# Patient Record
Sex: FEMALE | Race: WHITE | NOT HISPANIC OR LATINO | Employment: FULL TIME | URBAN - METROPOLITAN AREA
[De-identification: names, ages, dates, MRNs, and addresses within clinical notes are randomized per-mention and may not be internally consistent; named-entity substitution may affect disease eponyms.]

---

## 2017-08-15 ENCOUNTER — HOSPITAL ENCOUNTER (INPATIENT)
Facility: HOSPITAL | Age: 47
LOS: 3 days | Discharge: HOME | End: 2017-08-18
Attending: OTOLARYNGOLOGY | Admitting: OTOLARYNGOLOGY

## 2017-08-15 ENCOUNTER — HOSPITAL ENCOUNTER (EMERGENCY)
Facility: HOSPITAL | Age: 47
Discharge: HOME | End: 2017-08-15
Attending: EMERGENCY MEDICINE | Admitting: EMERGENCY MEDICINE

## 2017-08-15 ENCOUNTER — APPOINTMENT (OUTPATIENT)
Dept: CT IMAGING | Facility: HOSPITAL | Age: 47
End: 2017-08-15

## 2017-08-15 VITALS
TEMPERATURE: 98.9 F | OXYGEN SATURATION: 100 % | HEIGHT: 68 IN | BODY MASS INDEX: 26.52 KG/M2 | WEIGHT: 175 LBS | HEART RATE: 103 BPM | SYSTOLIC BLOOD PRESSURE: 105 MMHG | DIASTOLIC BLOOD PRESSURE: 63 MMHG | RESPIRATION RATE: 16 BRPM

## 2017-08-15 DIAGNOSIS — J36 PERITONSILLAR ABSCESS: Primary | ICD-10-CM

## 2017-08-15 LAB
ANION GAP SERPL CALCULATED.3IONS-SCNC: 15.9 MMOL/L
BASOPHILS # BLD AUTO: 0.01 10*3/MM3 (ref 0–0.2)
BASOPHILS # BLD AUTO: 0.03 10*3/MM3 (ref 0–0.2)
BASOPHILS NFR BLD AUTO: 0.1 % (ref 0–1.5)
BASOPHILS NFR BLD AUTO: 0.2 % (ref 0–2)
BUN BLD-MCNC: 8 MG/DL (ref 6–20)
BUN/CREAT SERPL: 10.1 (ref 7–25)
CALCIUM SPEC-SCNC: 9.2 MG/DL (ref 8.6–10.5)
CHLORIDE SERPL-SCNC: 97 MMOL/L (ref 98–107)
CO2 SERPL-SCNC: 22.1 MMOL/L (ref 22–29)
CREAT BLD-MCNC: 0.79 MG/DL (ref 0.57–1)
DEPRECATED RDW RBC AUTO: 45.1 FL (ref 37–54)
DEPRECATED RDW RBC AUTO: 48.3 FL (ref 37–54)
EOSINOPHIL # BLD AUTO: 0 10*3/MM3 (ref 0.1–0.3)
EOSINOPHIL # BLD AUTO: 0 10*3/MM3 (ref 0–0.7)
EOSINOPHIL NFR BLD AUTO: 0 % (ref 0.3–6.2)
EOSINOPHIL NFR BLD AUTO: 0 % (ref 0–4)
ERYTHROCYTE [DISTWIDTH] IN BLOOD BY AUTOMATED COUNT: 12.8 % (ref 11.5–14.5)
ERYTHROCYTE [DISTWIDTH] IN BLOOD BY AUTOMATED COUNT: 13.1 % (ref 11.7–13)
GFR SERPL CREATININE-BSD FRML MDRD: 77 ML/MIN/1.73
GLUCOSE BLD-MCNC: 123 MG/DL (ref 65–99)
HCT VFR BLD AUTO: 39.3 % (ref 35.6–45.5)
HCT VFR BLD AUTO: 40.1 % (ref 37–47)
HGB BLD-MCNC: 12.9 G/DL (ref 11.9–15.5)
HGB BLD-MCNC: 13.4 G/DL (ref 12–16)
IMM GRANULOCYTES # BLD: 0.04 10*3/MM3 (ref 0–0.03)
IMM GRANULOCYTES # BLD: 0.08 10*3/MM3 (ref 0–0.03)
IMM GRANULOCYTES NFR BLD: 0.2 % (ref 0–0.5)
IMM GRANULOCYTES NFR BLD: 0.5 % (ref 0–0.5)
LYMPHOCYTES # BLD AUTO: 0.82 10*3/MM3 (ref 0.6–4.8)
LYMPHOCYTES # BLD AUTO: 1.23 10*3/MM3 (ref 0.9–4.8)
LYMPHOCYTES NFR BLD AUTO: 5 % (ref 20–45)
LYMPHOCYTES NFR BLD AUTO: 6.9 % (ref 19.6–45.3)
MCH RBC QN AUTO: 32.1 PG (ref 27–31)
MCH RBC QN AUTO: 33.1 PG (ref 26.9–32)
MCHC RBC AUTO-ENTMCNC: 32.8 G/DL (ref 32.4–36.3)
MCHC RBC AUTO-ENTMCNC: 33.4 G/DL (ref 31–37)
MCV RBC AUTO: 100.8 FL (ref 80.5–98.2)
MCV RBC AUTO: 95.9 FL (ref 81–99)
MONOCYTES # BLD AUTO: 1.06 10*3/MM3 (ref 0–1)
MONOCYTES # BLD AUTO: 1.64 10*3/MM3 (ref 0.2–1.2)
MONOCYTES NFR BLD AUTO: 6.4 % (ref 3–8)
MONOCYTES NFR BLD AUTO: 9.2 % (ref 5–12)
NEUTROPHILS # BLD AUTO: 14.5 10*3/MM3 (ref 1.5–8.3)
NEUTROPHILS # BLD AUTO: 14.88 10*3/MM3 (ref 1.9–8.1)
NEUTROPHILS NFR BLD AUTO: 83.6 % (ref 42.7–76)
NEUTROPHILS NFR BLD AUTO: 87.9 % (ref 45–70)
NRBC BLD MANUAL-RTO: 0 /100 WBC (ref 0–0)
PLATELET # BLD AUTO: 229 10*3/MM3 (ref 140–500)
PLATELET # BLD AUTO: 276 10*3/MM3 (ref 140–500)
PMV BLD AUTO: 8.9 FL (ref 7.4–10.4)
PMV BLD AUTO: 9.4 FL (ref 6–12)
POTASSIUM BLD-SCNC: 3.7 MMOL/L (ref 3.5–5.2)
RBC # BLD AUTO: 3.9 10*6/MM3 (ref 3.9–5.2)
RBC # BLD AUTO: 4.18 10*6/MM3 (ref 4.2–5.4)
SODIUM BLD-SCNC: 135 MMOL/L (ref 136–145)
WBC NRBC COR # BLD: 16.49 10*3/MM3 (ref 4.8–10.8)
WBC NRBC COR # BLD: 17.8 10*3/MM3 (ref 4.5–10.7)

## 2017-08-15 PROCEDURE — 85025 COMPLETE CBC W/AUTO DIFF WBC: CPT | Performed by: OTOLARYNGOLOGY

## 2017-08-15 PROCEDURE — 99282 EMERGENCY DEPT VISIT SF MDM: CPT | Performed by: EMERGENCY MEDICINE

## 2017-08-15 PROCEDURE — 70491 CT SOFT TISSUE NECK W/DYE: CPT

## 2017-08-15 PROCEDURE — 25810000003 SODIUM CHLORIDE 0.9 % WITH KCL 20 MEQ 20-0.9 MEQ/L-% SOLUTION: Performed by: OTOLARYNGOLOGY

## 2017-08-15 PROCEDURE — 96365 THER/PROPH/DIAG IV INF INIT: CPT

## 2017-08-15 PROCEDURE — 96375 TX/PRO/DX INJ NEW DRUG ADDON: CPT

## 2017-08-15 PROCEDURE — 85025 COMPLETE CBC W/AUTO DIFF WBC: CPT | Performed by: EMERGENCY MEDICINE

## 2017-08-15 PROCEDURE — 25010000002 FENTANYL CITRATE (PF) 100 MCG/2ML SOLUTION: Performed by: EMERGENCY MEDICINE

## 2017-08-15 PROCEDURE — 99283 EMERGENCY DEPT VISIT LOW MDM: CPT

## 2017-08-15 PROCEDURE — 25010000002 MORPHINE PER 10 MG: Performed by: OTOLARYNGOLOGY

## 2017-08-15 PROCEDURE — G0378 HOSPITAL OBSERVATION PER HR: HCPCS

## 2017-08-15 PROCEDURE — 0 IOPAMIDOL 61 % SOLUTION: Performed by: OTOLARYNGOLOGY

## 2017-08-15 PROCEDURE — 80048 BASIC METABOLIC PNL TOTAL CA: CPT | Performed by: EMERGENCY MEDICINE

## 2017-08-15 RX ORDER — MORPHINE SULFATE 2 MG/ML
2 INJECTION, SOLUTION INTRAMUSCULAR; INTRAVENOUS EVERY 4 HOURS PRN
Status: DISCONTINUED | OUTPATIENT
Start: 2017-08-15 | End: 2017-08-15

## 2017-08-15 RX ORDER — OXYCODONE HYDROCHLORIDE AND ACETAMINOPHEN 5; 325 MG/1; MG/1
2 TABLET ORAL EVERY 4 HOURS PRN
Status: DISCONTINUED | OUTPATIENT
Start: 2017-08-15 | End: 2017-08-18 | Stop reason: HOSPADM

## 2017-08-15 RX ORDER — CLINDAMYCIN HYDROCHLORIDE 300 MG/1
300 CAPSULE ORAL 4 TIMES DAILY
Qty: 40 CAPSULE | Refills: 0 | Status: SHIPPED | OUTPATIENT
Start: 2017-08-15 | End: 2017-08-18 | Stop reason: HOSPADM

## 2017-08-15 RX ORDER — CLINDAMYCIN PHOSPHATE 900 MG/50ML
900 INJECTION INTRAVENOUS EVERY 6 HOURS
Status: DISCONTINUED | OUTPATIENT
Start: 2017-08-15 | End: 2017-08-18 | Stop reason: HOSPADM

## 2017-08-15 RX ORDER — SODIUM CHLORIDE AND POTASSIUM CHLORIDE 150; 900 MG/100ML; MG/100ML
110 INJECTION, SOLUTION INTRAVENOUS CONTINUOUS
Status: DISCONTINUED | OUTPATIENT
Start: 2017-08-15 | End: 2017-08-18 | Stop reason: HOSPADM

## 2017-08-15 RX ORDER — ONDANSETRON HYDROCHLORIDE 8 MG/1
8 TABLET, FILM COATED ORAL EVERY 8 HOURS PRN
Status: DISCONTINUED | OUTPATIENT
Start: 2017-08-15 | End: 2017-08-18 | Stop reason: HOSPADM

## 2017-08-15 RX ORDER — OXYCODONE HYDROCHLORIDE AND ACETAMINOPHEN 5; 325 MG/1; MG/1
TABLET ORAL
Qty: 24 TABLET | Refills: 0 | Status: SHIPPED | OUTPATIENT
Start: 2017-08-15 | End: 2017-08-18 | Stop reason: HOSPADM

## 2017-08-15 RX ORDER — MORPHINE SULFATE 2 MG/ML
2 INJECTION, SOLUTION INTRAMUSCULAR; INTRAVENOUS EVERY 4 HOURS PRN
Status: DISCONTINUED | OUTPATIENT
Start: 2017-08-15 | End: 2017-08-18 | Stop reason: HOSPADM

## 2017-08-15 RX ORDER — SODIUM CHLORIDE 0.9 % (FLUSH) 0.9 %
10 SYRINGE (ML) INJECTION AS NEEDED
Status: DISCONTINUED | OUTPATIENT
Start: 2017-08-15 | End: 2017-08-15 | Stop reason: HOSPADM

## 2017-08-15 RX ORDER — CLINDAMYCIN PHOSPHATE 900 MG/50ML
900 INJECTION INTRAVENOUS ONCE
Status: COMPLETED | OUTPATIENT
Start: 2017-08-15 | End: 2017-08-15

## 2017-08-15 RX ORDER — FENTANYL CITRATE 50 UG/ML
75 INJECTION, SOLUTION INTRAMUSCULAR; INTRAVENOUS ONCE
Status: COMPLETED | OUTPATIENT
Start: 2017-08-15 | End: 2017-08-15

## 2017-08-15 RX ADMIN — POTASSIUM CHLORIDE AND SODIUM CHLORIDE 110 ML/HR: 900; 150 INJECTION, SOLUTION INTRAVENOUS at 16:40

## 2017-08-15 RX ADMIN — FENTANYL CITRATE 75 MCG: 50 INJECTION, SOLUTION INTRAMUSCULAR; INTRAVENOUS at 10:01

## 2017-08-15 RX ADMIN — MORPHINE SULFATE 2 MG: 2 INJECTION, SOLUTION INTRAMUSCULAR; INTRAVENOUS at 20:27

## 2017-08-15 RX ADMIN — MORPHINE SULFATE 2 MG: 2 INJECTION, SOLUTION INTRAMUSCULAR; INTRAVENOUS at 15:45

## 2017-08-15 RX ADMIN — CLINDAMYCIN PHOSPHATE 900 MG: 18 INJECTION, SOLUTION INTRAMUSCULAR; INTRAVENOUS at 16:40

## 2017-08-15 RX ADMIN — CLINDAMYCIN PHOSPHATE 900 MG: 18 INJECTION, SOLUTION INTRAVENOUS at 10:03

## 2017-08-15 RX ADMIN — IOPAMIDOL 75 ML: 612 INJECTION, SOLUTION INTRAVENOUS at 17:00

## 2017-08-15 RX ADMIN — CLINDAMYCIN PHOSPHATE 900 MG: 18 INJECTION, SOLUTION INTRAMUSCULAR; INTRAVENOUS at 22:25

## 2017-08-15 NOTE — ED PROVIDER NOTES
"Subjective   History of Present Illness  History of Present Illness    · Chief complaint: Left ear and throat pain    · Location: Left ear and throat    · Quality/Severity: Severe pain \"like an ice pick being pokeed in my ear\"    · Timing/Onset: The pain started last night.    · Modifying Factors: Nothing relieves.    · Associated symptoms: She denies fever or pain in the right ear.    · Narrative: The patient is a 47-year-old white female complaining of severe pain in her left ear and a burning sensation in her throat since last night.  She denies any hearing loss.    ED Triage Vitals   Temp Heart Rate Resp BP SpO2   08/15/17 0855 08/15/17 0855 08/15/17 0855 08/15/17 0855 08/15/17 0855   98.9 °F (37.2 °C) 74 20 125/57 99 %      Temp src Heart Rate Source Patient Position BP Location FiO2 (%)   08/15/17 0855 08/15/17 0855 08/15/17 0855 08/15/17 0855 --   Oral Monitor Lying Left arm        Review of Systems   Constitutional: Negative for activity change, appetite change, chills, diaphoresis, fatigue and fever.   HENT: Positive for ear pain and sore throat. Negative for congestion, dental problem, hearing loss, mouth sores, postnasal drip, rhinorrhea, sinus pressure and voice change.    Eyes: Negative for photophobia, pain, discharge, redness and visual disturbance.   Respiratory: Negative for cough, chest tightness, shortness of breath, wheezing and stridor.    Cardiovascular: Negative for chest pain, palpitations and leg swelling.   Gastrointestinal: Negative for abdominal pain, diarrhea, nausea and vomiting.   Genitourinary: Negative for difficulty urinating, dysuria, flank pain, frequency, hematuria and urgency.   Musculoskeletal: Negative for arthralgias, back pain, gait problem, joint swelling, myalgias, neck pain and neck stiffness.   Skin: Negative for color change and rash.   Neurological: Negative for dizziness, tremors, seizures, syncope, facial asymmetry, speech difficulty, weakness, light-headedness, " numbness and headaches.   Hematological: Negative for adenopathy.   Psychiatric/Behavioral: Negative.  Negative for confusion and decreased concentration. The patient is not nervous/anxious.        Past Medical History:   Diagnosis Date   • Abnormal uterine bleeding    • Back pain    • Uterine fibroid     uterine and ovarian nodules       Allergies   Allergen Reactions   • Penicillins Anaphylaxis       Past Surgical History:   Procedure Laterality Date   • D&C HYSTEROSCOPY N/A 5/17/2016    Procedure: DILATATION AND CURETTAGE HYSTEROSCOPY;  Surgeon: Suzy Chino MD;  Location: [facility];  Service:    • DILATATION AND CURETTAGE     • TUBAL ABDOMINAL LIGATION         Family History   Problem Relation Age of Onset   • Breast cancer Maternal Grandmother    • Breast cancer Maternal Aunt        Social History     Social History   • Marital status:      Spouse name: N/A   • Number of children: N/A   • Years of education: N/A     Social History Main Topics   • Smoking status: Current Every Day Smoker     Packs/day: 0.25     Years: 30.00   • Smokeless tobacco: Never Used      Comment: Less than a pack a week   • Alcohol use Yes      Comment: occasionally   • Drug use: No   • Sexual activity: Defer     Other Topics Concern   • None     Social History Narrative   • None           Objective   Physical Exam   Constitutional: She is oriented to person, place, and time. She appears well-developed and well-nourished. She appears distressed.   The patient is in severe distress secondary to pain.   HENT:   Head: Normocephalic and atraumatic.   Right Ear: External ear normal.   Left Ear: External ear normal.   Nose: Nose normal.   Mouth/Throat: No oropharyngeal exudate.   Both external ear canals are clean without exudate or swelling.  Both tympanic membranes are normal in appearance.  On oral examination the patient has a large left peritonsillar swelling consistent with a peritonsillar abscess.  The pharyngeal  erythema and there is no exudates.   Eyes: EOM are normal. Pupils are equal, round, and reactive to light. Right eye exhibits no discharge. Left eye exhibits no discharge. No scleral icterus.   Neck: Normal range of motion. Neck supple. No JVD present. No thyromegaly present.   Cardiovascular: Normal rate, regular rhythm and normal heart sounds.    No murmur heard.  Pulmonary/Chest: Effort normal and breath sounds normal. She has no wheezes. She has no rales. She exhibits no tenderness.   Abdominal: Soft. Bowel sounds are normal. She exhibits no distension. There is no tenderness.   Musculoskeletal: Normal range of motion. She exhibits no edema, tenderness or deformity.   Lymphadenopathy:     She has no cervical adenopathy.   Neurological: She is alert and oriented to person, place, and time. No cranial nerve deficit. Coordination normal.   No focal motor sensory deficit   Skin: Skin is warm and dry. No rash noted. She is not diaphoretic.   Psychiatric: She has a normal mood and affect. Her behavior is normal. Judgment and thought content normal.   Nursing note and vitals reviewed.      Procedures         ED Course  ED Course   Comment By Time   Fentanyl 75 µg IV.  Clindamycin 900 mg IV. Donato Munoz MD 08/15 1040   09:40 ACC discussed with Dr. Garnde, ENT, requests the patient be sent directly after IV antibiotics to their [place] office to see him so he can I&D the recovery tonsillar abscess. Donato Munoz MD 08/15 1040   The patient stated her pain was significantly improved at discharge.  I instructed her to go directly to Dr. Grande's office. Donato Munoz MD 08/15 0357                  MDM  Number of Diagnoses or Management Options  Peritonsillar abscess: new and does not require workup     Amount and/or Complexity of Data Reviewed  Discuss the patient with other providers: yes    Risk of Complications, Morbidity, and/or Mortality  Presenting problems: high  Diagnostic procedures: low  Management options:  high    Patient Progress  Patient progress: stable      Final diagnoses:   Peritonsillar abscess           Labs Reviewed   BASIC METABOLIC PANEL - Abnormal; Notable for the following:        Result Value    Glucose 123 (*)     Sodium 135 (*)     Chloride 97 (*)     All other components within normal limits    Narrative:     GFR Normal >60  Chronic Kidney Disease <60  Kidney Failure <15   CBC WITH AUTO DIFFERENTIAL - Abnormal; Notable for the following:     WBC 16.49 (*)     RBC 4.18 (*)     MCH 32.1 (*)     Neutrophil % 87.9 (*)     Lymphocyte % 5.0 (*)     Neutrophils, Absolute 14.50 (*)     Monocytes, Absolute 1.06 (*)     Eosinophils, Absolute 0.00 (*)     Immature Grans, Absolute 0.08 (*)     All other components within normal limits   CBC AND DIFFERENTIAL    Narrative:     The following orders were created for panel order CBC & Differential.  Procedure                               Abnormality         Status                     ---------                               -----------         ------                     CBC Auto Differential[[id number]]         Abnormal            Final result                 Please view results for these tests on the individual orders.     No orders to display          Medication List      New Prescriptions          clindamycin 300 MG capsule   Commonly known as:  CLEOCIN   Take 1 capsule by mouth 4 (Four) Times a Day for 10 days.       oxyCODONE-acetaminophen 5-325 MG per tablet   Commonly known as:  PERCOCET   1 - 2 tablets po q 4 hours PRN sever pain                Donato Munoz MD  08/15/17 6634

## 2017-08-15 NOTE — PLAN OF CARE
Problem: Patient Care Overview (Adult)  Goal: Plan of Care Review  Outcome: Ongoing (interventions implemented as appropriate)    08/15/17 1035   Coping/Psychosocial Response Interventions   Plan Of Care Reviewed With patient   Patient Care Overview   Progress improving   Outcome Evaluation   Outcome Summary/Follow up Plan Direct admit of Dr. Grande. Patient seen in [facility] ER, earlier this morning. Persistent pain in left facial area extending into neck, pain controlled with IV morphine. Admitted for peritonsillar abscess per Dr. Grande. Unable to eat or drink at this time. IV fluids and antibiotics started. CT of neck complete. VSS. Telemetry monitor, NSR. Will continue to monitor.          Problem: Pain, Acute (Adult)  Goal: Identify Related Risk Factors and Signs and Symptoms  Outcome: Outcome(s) achieved Date Met:  08/15/17  Goal: Acceptable Pain Control/Comfort Level  Outcome: Ongoing (interventions implemented as appropriate)    Problem: Fall Risk (Adult)  Goal: Identify Related Risk Factors and Signs and Symptoms  Outcome: Ongoing (interventions implemented as appropriate)  Goal: Absence of Falls  Outcome: Ongoing (interventions implemented as appropriate)

## 2017-08-15 NOTE — ED NOTES
Call placed to ENT office at [phone], transferred to Dr. Munoz.   Call Complete      Stephany Urena CNA  08/15/17 4773

## 2017-08-16 PROBLEM — J36 PERITONSILLAR ABSCESS: Status: ACTIVE | Noted: 2017-08-16

## 2017-08-16 LAB
BASOPHILS # BLD AUTO: 0.03 10*3/MM3 (ref 0–0.2)
BASOPHILS NFR BLD AUTO: 0.2 % (ref 0–1.5)
DEPRECATED RDW RBC AUTO: 49.2 FL (ref 37–54)
EOSINOPHIL # BLD AUTO: 0 10*3/MM3 (ref 0–0.7)
EOSINOPHIL NFR BLD AUTO: 0 % (ref 0.3–6.2)
ERYTHROCYTE [DISTWIDTH] IN BLOOD BY AUTOMATED COUNT: 13.2 % (ref 11.7–13)
HCT VFR BLD AUTO: 37.6 % (ref 35.6–45.5)
HGB BLD-MCNC: 11.9 G/DL (ref 11.9–15.5)
IMM GRANULOCYTES # BLD: 0.04 10*3/MM3 (ref 0–0.03)
IMM GRANULOCYTES NFR BLD: 0.3 % (ref 0–0.5)
LYMPHOCYTES # BLD AUTO: 1.63 10*3/MM3 (ref 0.9–4.8)
LYMPHOCYTES NFR BLD AUTO: 10.9 % (ref 19.6–45.3)
MCH RBC QN AUTO: 31.9 PG (ref 26.9–32)
MCHC RBC AUTO-ENTMCNC: 31.6 G/DL (ref 32.4–36.3)
MCV RBC AUTO: 100.8 FL (ref 80.5–98.2)
MONOCYTES # BLD AUTO: 1.59 10*3/MM3 (ref 0.2–1.2)
MONOCYTES NFR BLD AUTO: 10.7 % (ref 5–12)
NEUTROPHILS # BLD AUTO: 11.61 10*3/MM3 (ref 1.9–8.1)
NEUTROPHILS NFR BLD AUTO: 77.9 % (ref 42.7–76)
NRBC BLD MANUAL-RTO: 0 /100 WBC (ref 0–0)
PLATELET # BLD AUTO: 246 10*3/MM3 (ref 140–500)
PMV BLD AUTO: 9.4 FL (ref 6–12)
RBC # BLD AUTO: 3.73 10*6/MM3 (ref 3.9–5.2)
WBC NRBC COR # BLD: 14.9 10*3/MM3 (ref 4.5–10.7)

## 2017-08-16 PROCEDURE — 25010000002 MORPHINE PER 10 MG: Performed by: OTOLARYNGOLOGY

## 2017-08-16 PROCEDURE — 85025 COMPLETE CBC W/AUTO DIFF WBC: CPT | Performed by: OTOLARYNGOLOGY

## 2017-08-16 PROCEDURE — 25810000003 SODIUM CHLORIDE 0.9 % WITH KCL 20 MEQ 20-0.9 MEQ/L-% SOLUTION: Performed by: OTOLARYNGOLOGY

## 2017-08-16 RX ADMIN — MORPHINE SULFATE 4 MG: 4 INJECTION, SOLUTION INTRAMUSCULAR; INTRAVENOUS at 20:51

## 2017-08-16 RX ADMIN — MORPHINE SULFATE 4 MG: 4 INJECTION, SOLUTION INTRAMUSCULAR; INTRAVENOUS at 13:00

## 2017-08-16 RX ADMIN — CLINDAMYCIN PHOSPHATE 900 MG: 18 INJECTION, SOLUTION INTRAMUSCULAR; INTRAVENOUS at 20:52

## 2017-08-16 RX ADMIN — POTASSIUM CHLORIDE AND SODIUM CHLORIDE 110 ML/HR: 900; 150 INJECTION, SOLUTION INTRAVENOUS at 20:52

## 2017-08-16 RX ADMIN — CLINDAMYCIN PHOSPHATE 900 MG: 18 INJECTION, SOLUTION INTRAMUSCULAR; INTRAVENOUS at 16:27

## 2017-08-16 RX ADMIN — POTASSIUM CHLORIDE AND SODIUM CHLORIDE 110 ML/HR: 900; 150 INJECTION, SOLUTION INTRAVENOUS at 01:49

## 2017-08-16 RX ADMIN — MORPHINE SULFATE 2 MG: 2 INJECTION, SOLUTION INTRAMUSCULAR; INTRAVENOUS at 04:09

## 2017-08-16 RX ADMIN — CLINDAMYCIN PHOSPHATE 900 MG: 18 INJECTION, SOLUTION INTRAMUSCULAR; INTRAVENOUS at 04:09

## 2017-08-16 RX ADMIN — MORPHINE SULFATE 4 MG: 4 INJECTION, SOLUTION INTRAMUSCULAR; INTRAVENOUS at 08:49

## 2017-08-16 RX ADMIN — CLINDAMYCIN PHOSPHATE 900 MG: 18 INJECTION, SOLUTION INTRAMUSCULAR; INTRAVENOUS at 09:56

## 2017-08-16 NOTE — SIGNIFICANT NOTE
08/16/17 0857   Rehab Treatment   Discipline speech language pathologist   Rehab Evaluation   Evaluation Not Performed other (see comments)  (Pt unable to participate in swallow evaluation.  Pt with left sided soreness of neck to touch.  States she is unable to swallow liquids or saliva.  Spitting out saliva on entrance to room.  Will f/u for ability to paricipate .)

## 2017-08-16 NOTE — PAYOR COMM NOTE
"Cara Eisenberg (47 y.o. Female)     PLEASE SEE ATTACHED CLINICAL REVIEW ON PATIENT. PT. WAS ADMITTED TO [facility] ON 8/15/17.    REF#[id number]    PLEASE CALL   OR  [phone] WITH INPT AUTH AND DAYS APPROVED. [facility] IS DRG WITH [facility].     THANK YOU    NERIS JEFFRIES, KRISTYN, CCP    Date of Birth Social Security Number Address Home Phone MRN    1970  [id number] [phone] [id number]    [employer] Status          None        Admission Date Admission Type Admitting Provider Attending Provider Department, Room/Bed    8/15/17 Elective Jesus Grande MD Gould, Jesus Calderon MD [address], 632/1    Discharge Date Discharge Disposition Discharge Destination                      Attending Provider: Jesus Grande MD     Allergies:  Penicillins    Isolation:  None   Infection:  None   Code Status:  FULL    Ht:  68\" (172.7 cm)   Wt:  175 lb (79.4 kg)    Admission Cmt:  None   Principal Problem:  None                Active Insurance as of 8/15/2017     Primary Coverage     Payor Plan Insurance Group Employer/Plan Group    [employer]UE [employer] PPO [id number]     Payor Plan Address Payor Plan Phone Number Effective From Effective To    [phone] 10/1/2015     [place] [address]       Subscriber Name Subscriber Birth Date Member ID       CARA EISENBERG 1970 [id number]                 Emergency Contacts      (Rel.) Home Phone Work Phone Mobile Phone    Michi Eisenberg (Spouse) [phone] --              Intake & Output (last day)       08/15 0701 - 08/16 0700 08/16 0701 - 08/17 0700    Urine (mL/kg/hr) 650     Total Output 650      Net -650            Unmeasured Urine Occurrence 1 x         Lab Results (all)     Procedure Component Value Units Date/Time    CBC & Differential [[id number]] Collected:  08/15/17 1703    Specimen:  Blood Updated:  " 08/15/17 1719    Narrative:       CBC Auto Differential [[id number]]  (Abnormal) Collected:  08/15/17 1703    Specimen:  Blood Updated:  08/15/17 1719     WBC 17.80 (H) 10*3/mm3      RBC 3.90 10*6/mm3      Hemoglobin 12.9 g/dL      Hematocrit 39.3 %      .8 (H) fL      MCH 33.1 (H) pg      MCHC 32.8 g/dL      RDW 13.1 (H) %      RDW-SD 48.3 fl      MPV 9.4 fL      Platelets 229 10*3/mm3      Neutrophil % 83.6 (H) %      Lymphocyte % 6.9 (L) %      Monocyte % 9.2 %      Eosinophil % 0.0 (L) %      Basophil % 0.1 %      Immature Grans % 0.2 %      Neutrophils, Absolute 14.88 (H) 10*3/mm3      Lymphocytes, Absolute 1.23 10*3/mm3      Monocytes, Absolute 1.64 (H) 10*3/mm3      Eosinophils, Absolute 0.00 10*3/mm3      Basophils, Absolute 0.01 10*3/mm3      Immature Grans, Absolute 0.04 (H) 10*3/mm3     CBC & Differential [[id number]] Collected:  08/16/17 0512    Specimen:  Blood Updated:  08/16/17 0607    Narrative:       CBC Auto Differential [[id number]]  (Abnormal) Collected:  08/16/17 0512    Specimen:  Blood Updated:  08/16/17 0607     WBC 14.90 (H) 10*3/mm3      RBC 3.73 (L) 10*6/mm3      Hemoglobin 11.9 g/dL      Hematocrit 37.6 %      .8 (H) fL      MCH 31.9 pg      MCHC 31.6 (L) g/dL      RDW 13.2 (H) %      RDW-SD 49.2 fl      MPV 9.4 fL      Platelets 246 10*3/mm3      Neutrophil % 77.9 (H) %      Lymphocyte % 10.9 (L) %      Monocyte % 10.7 %      Eosinophil % 0.0 (L) %      Basophil % 0.2 %      Immature Grans % 0.3 %      Neutrophils, Absolute 11.61 (H) 10*3/mm3      Lymphocytes, Absolute 1.63 10*3/mm3      Monocytes, Absolute 1.59 (H) 10*3/mm3      Eosinophils, Absolute 0.00 10*3/mm3      Basophils, Absolute 0.03 10*3/mm3      Immature Grans, Absolute 0.04 (H) 10*3/mm3      nRBC 0.0 /100 WBC           Imaging Results (all)     Procedure Component Value Units Date/Time    CT Soft Tissue Neck With Contrast [[id number]] Collected:  08/15/17 1715     Updated:  08/15/17 1737    Narrative:       CT  SOFT TISSUE NECK W CONTRAST-     INDICATIONS: Possible peritonsillar abscess     TECHNIQUE: ENHANCED NECK CT     COMPARISON: None available     FINDINGS:     Pharyngeal tonsillar hypertrophy is present bilaterally, more prominent  on the left than on the right. Several foci of low density are seen in  the pharyngeal tonsils, for example 9 mm on image 27 of series 1, or 1.3  cm on image 28 of series 1, suspicious for developing multiloculated  abscess.     Asymmetric soft tissue swelling extends inferiorly along the left  hypopharynx to the level of the base of the epiglottis, for example  image 42 of series 1. Uvular and soft palate hypertrophy are apparent.     At the indicated area of palpable concern at the left anterior lateral  upper neck, asymmetric jugulodigastric lymph node hypertrophy is seen,  for example 3.8 cm on image 48 of series 2, likely reactive in nature,  possibly neoplasm not entirely excluded, follow-up recommended. Right  jugular digastric lymph node is also prominent, 3.2 cm on image 46 of  series 2.     No other enhancing collections or masses are identified in the neck.     Along the inferior posterior aspect of the left parotid gland, image 30  of series 1, 4 mm nodular density may represent lymph node parotid  nodule. Interval follow-up could characterize change. Parotid,  submandibular, thyroid glands otherwise appear unremarkable.     Chronic appearing asymmetry of the right anterior maxillary sinus wall  could be result of past injury. The right maxillary sinus is mostly  opacified.     Visualized lung apices appear clear.     Visualized base of the brain shows likely arachnoid cyst posterior  fossa, 2.3 cm on image 10 of series 1.       Impression:             1. Left more than right pharyngeal tonsillar hypertrophy, with  appearance suspicious for developing multiloculated abscess in the left  pharyngeal tonsils, as well as conspicuous asymmetric left  hypopharyngitis. Close follow-up  imaging can characterize progression of  the findings. Jugulodigastric lymph node hypertrophy, left more than  right.     2. Right maxillary sinus disease.     Discussed by telephone with patient's nurse, Sallie, at time of  interpretation, 1734 on 08/15/2017     This report was finalized on 8/15/2017 5:34 PM by Dr. Asad Castle MD.             Orders (all)     Start     Ordered    08/25/17 1550  morphine injection 4 mg  Every 4 Hours PRN,   Status:  Discontinued      08/15/17 1550    08/16/17 0749  Inpatient Admission  Once      08/16/17 0751    08/16/17 0749  Full Code  Continuous      08/16/17 0751    08/16/17 0600  CBC & Differential  Daily      08/15/17 1537    08/16/17 0600  CBC Auto Differential  PROCEDURE ONCE      08/16/17 0001    08/16/17 0553  Scan Slide  Once,   Status:  Canceled      08/16/17 0552    08/15/17 1700  iopamidol (ISOVUE-300) 61 % injection 100 mL  Once in Imaging      08/15/17 1627    08/15/17 1615  clindamycin (CLEOCIN) 900 mg in dextrose 5% 50 mL IVPB (premix)  Every 6 Hours      08/15/17 1537      08/15/17 1615  sodium chloride 0.9 % with KCl 20 mEq/L infusion  Continuous      08/15/17 1537    08/15/17 1600  Vital Signs  Every 8 Hours      08/15/17 1537    08/15/17 1551  morphine injection 2 mg  Every 4 Hours PRN      08/15/17 1552    08/15/17 1551  morphine injection 4 mg  Every 4 Hours PRN      08/15/17 1552    08/15/17 1550  morphine injection 2 mg  Every 4 Hours PRN,   Status:  Discontinued      08/15/17 1550    08/15/17 1536  SLP Consult: Eval & Treat  Once      08/15/17 1537    08/15/17 1533  Oral Suction  Once      08/15/17 1537    08/15/17 1533  CBC & Differential  Once      08/15/17 1537    08/15/17 1533  CBC Auto Differential  PROCEDURE ONCE      08/15/17 1537    08/15/17 1532  CT Soft Tissue Neck With Contrast  1 Time Imaging      08/15/17 1537    08/15/17 1532  Head of Bed  Until Discontinued     Comments:  Head of bed 30 degrees    08/15/17 1537    08/15/17 1531  Diet  Clear Liquid  Diet Effective Now      08/15/17 1537    08/15/17 1530  ondansetron (ZOFRAN) tablet 8 mg  Every 8 Hours PRN      08/15/17 1537    08/15/17 1529  morphine injection 2 mg  Every 4 Hours PRN,   Status:  Discontinued      08/15/17 1537    08/15/17 1528  oxyCODONE-acetaminophen (PERCOCET) 5-325 MG per tablet 2 tablet  Every 4 Hours PRN      08/15/17 1537    08/15/17 1528  Activity As Tolerated  Until Discontinued      08/15/17 1537          April Hernández MS CCC-SLP Speech and Language Pathologist Signed Speech Therapy Significant Event Date of Service: 8/16/2017  8:58 AM                08/16/17 0857   Rehab Treatment   Discipline speech language pathologist   Rehab Evaluation   Evaluation Not Performed other (see comments)  (Pt unable to participate in swallow evaluation.  Pt with left sided soreness of neck to touch.  States she is unable to swallow liquids or saliva.  Spitting out saliva on entrance to room.  Will f/u for ability to paricipate .)                          Elsa Montague, RN Registered Nurse Signed  Plan of Care Date of Service: 8/16/2017  3:24 AM         Problem: Patient Care Overview (Adult)  Goal: Plan of Care Review  Outcome: Ongoing (interventions implemented as appropriate)     08/16/17 0317   Coping/Psychosocial Response Interventions   Plan Of Care Reviewed With patient   Patient Care Overview   Progress improving   Outcome Evaluation   Outcome Summary/Follow up Plan Swelling of left side of face still visible, on IV antibx, IVF maintained, pain on and off, better early AM, offered pain meds but refused at one point, up with assist to bathroom, talked with ENT on-call yesterday and they said they are going to see patient in Am, NSR in monitor, VSS, no fever, will monitor       Goal: Adult Individualization and Mutuality  Outcome: Ongoing (interventions implemented as appropriate)     08/16/17 0317   Individualization   Patient Specific Goals pain control and treat infection and  swelling   Patient Specific Interventions given pain meds as needed, IVantibx maintained          Problem: Pain, Acute (Adult)  Goal: Acceptable Pain Control/Comfort Level  Outcome: Ongoing (interventions implemented as appropriate)     08/16/17 0317   Pain, Acute (Adult)   Acceptable Pain Control/Comfort Level making progress toward outcome          Problem: Fall Risk (Adult)  Goal: Identify Related Risk Factors and Signs and Symptoms  Outcome: Ongoing (interventions implemented as appropriate)     08/16/17 0317   Fall Risk   Fall Risk: Related Risk Factors fatigue/slow reaction   Fall Risk: Signs and Symptoms presence of risk factors       Goal: Absence of Falls  Outcome: Ongoing (interventions implemented as appropriate)     08/16/17 0317   Fall Risk (Adult)   Absence of Falls making progress toward outcome          Problem: Infection, Risk/Actual (Adult)  Goal: Identify Related Risk Factors and Signs and Symptoms  Outcome: Ongoing (interventions implemented as appropriate)  Goal: Infection Prevention/Resolution  Outcome: Ongoing (interventions implemented as appropriate)     08/16/17 0317   Infection, Risk/Actual (Adult)   Infection Prevention/Resolution making progress toward outcome                                 Sallie Manzo RN Registered Nurse Signed  Plan of Care Date of Service: 8/15/2017  6:26 PM         Problem: Patient Care Overview (Adult)  Goal: Plan of Care Review  Outcome: Ongoing (interventions implemented as appropriate)     08/15/17 1823   Coping/Psychosocial Response Interventions   Plan Of Care Reviewed With patient   Patient Care Overview   Progress improving   Outcome Evaluation   Outcome Summary/Follow up Plan Direct admit of Dr. Grande. Patient seen in [facility] ER, earlier this morning. Persistent pain in left facial area extending into neck, pain controlled with IV morphine. Admitted for peritonsillar abscess per Dr. Grande. Unable to eat or drink at this time. IV fluids and  antibiotics started. CT of neck complete. VSS. Telemetry monitor, NSR. Will continue to monitor.           Problem: Pain, Acute (Adult)  Goal: Identify Related Risk Factors and Signs and Symptoms  Outcome: Outcome(s) achieved Date Met:  08/15/17  Goal: Acceptable Pain Control/Comfort Level  Outcome: Ongoing (interventions implemented as appropriate)     Problem: Fall Risk (Adult)  Goal: Identify Related Risk Factors and Signs and Symptoms  Outcome: Ongoing (interventions implemented as appropriate)  Goal: Absence of Falls  Outcome: Ongoing (interventions implemented as appropriate)                                      All medication doses during the admission are shown, including meds that are no longer on order.     Scheduled Meds Sorted by Name for Cara Eisenberg as of 8/14/17 through 8/16/17       1 Day 3 Days 7 Days 10 Days < Today >    Legend:                          Inactive     Active     Other Encounter    Linked               Medications 08/14/17 08/15/17 08/16/17   ceFAZolin (ANCEF) IVPB (duplex) 1 g  Dose: 1 g Freq: Once Route: IV  Indications of Use: SKIN AND SOFT TISSUE INFECTION  Start: 08/15/17 0937 End: 08/15/17 0942     0937     0942-D/C'd         clindamycin (CLEOCIN) 900 mg in dextrose 5% 50 mL IVPB (premix)  Dose: 900 mg Freq: Every 6 Hours Route: IV  Indications Comment: Peritonsillar Abscess  Last Dose: Stopped (08/16/17 0510)  Start: 08/15/17 1615    Admin Instructions:   Do Not refrigerate.     1640     2000     2225       2330            0409     0510     1015       1615     2215            clindamycin (CLEOCIN) 900 mg in dextrose 5% 50 mL IVPB (premix)  Dose: 900 mg Freq: Once Route: IV  Indications of Use: SKIN AND SOFT TISSUE INFECTION  Last Dose: Stopped (08/15/17 1031)  Start: 08/15/17 0944 End: 08/15/17 1031    Admin Instructions:   Do Not refrigerate.     1003     1031             fentaNYL citrate (PF) (SUBLIMAZE) injection 75 mcg  Dose: 75 mcg Freq: Once Route: IV  Start: 08/15/17  0937 End: 08/15/17 1001    Admin Instructions:   Use filter needle to withdraw dose from ampule.  If given for pain, use the following pain scale:  Mild Pain = Pain Score of 1-3, CPOT 1-2  Moderate Pain = Pain Score of 4-6, CPOT 3-4  Severe Pain = Pain Score of 7-10, CPOT 5-8     1001               iopamidol (ISOVUE-300) 61 % injection 100 mL  Dose: 100 mL Freq: Once in Imaging Route: IV  Start: 08/15/17 1700 End: 08/15/17 1700     1700               Medications 08/14/17 08/15/17 08/16/17         Continuous Meds Sorted by Name for Cara Eisenberg as of 8/14/17 through 8/16/17      Legend:                          Inactive     Active     Other Encounter    Linked               Medications 08/14/17 08/15/17 08/16/17   sodium chloride 0.9 % with KCl 20 mEq/L infusion  Rate: 110 mL/hr Freq: Continuous Route: IV  Last Dose: 110 mL/hr (08/16/17 0630)  Start: 08/15/17 1615     1640     2129     2333        0139     0149     0424       0630                    PRN Meds Sorted by Name for Cara Eisenberg as of 8/14/17 through 8/16/17      Legend:                          Inactive     Active     Other Encounter    Linked               Medications 08/14/17 08/15/17 08/16/17   morphine injection 2 mg  Dose: 2 mg Freq: Every 4 Hours PRN Route: IV  PRN Reason: Severe Pain   PRN Comment: break through pain  Start: 08/15/17 1551 End: 08/25/17 1550    Admin Instructions:   If given for pain, use the following pain scale:  Mild Pain = Pain Score of 1-3, CPOT 1-2  Moderate Pain = Pain Score of 4-6, CPOT 3-4  Severe Pain = Pain Score of 7-10, CPOT 5-8     5785            0402             Or  morphine injection 4 mg  Dose: 4 mg Freq: Every 4 Hours PRN Route: IV  PRN Reason: Severe Pain   PRN Comment: break through pain  Start: 08/15/17 1551 End: 08/25/17 1550    Admin Instructions:   If given for pain, use the following pain scale:  Mild Pain = Pain Score of 1-3, CPOT 1-2  Moderate Pain = Pain Score of 4-6, CPOT 3-4  Severe Pain = Pain  Score of 7-10, CPOT 5-8              0849            morphine injection 2 mg  Dose: 2 mg Freq: Every 4 Hours PRN Route: IV  PRN Reason: Severe Pain   Start: 08/15/17 1550 End: 08/15/17 1552    Admin Instructions:   If given for pain, use the following pain scale:  Mild Pain = Pain Score of 1-3, CPOT 1-2  Moderate Pain = Pain Score of 4-6, CPOT 3-4  Severe Pain = Pain Score of 7-10, CPOT 5-8     1552-D/C'd           Followed by  morphine injection 4 mg  Dose: 4 mg Freq: Every 4 Hours PRN Route: IV  PRN Reason: Severe Pain   Start: 08/25/17 1550 End: 08/15/17 1552    Admin Instructions:   If given for pain, use the following pain scale:  Mild Pain = Pain Score of 1-3, CPOT 1-2  Moderate Pain = Pain Score of 4-6, CPOT 3-4  Severe Pain = Pain Score of 7-10, CPOT 5-8     1552-D/C'd           morphine injection 2 mg  Dose: 2 mg Freq: Every 4 Hours PRN Route: IV  PRN Reason: Severe Pain   PRN Comment: Break through pain  Start: 08/15/17 1529 End: 08/15/17 1552    Admin Instructions:   2-4 mg  If given for pain, use the following pain scale:  Mild Pain = Pain Score of 1-3, CPOT 1-2  Moderate Pain = Pain Score of 4-6, CPOT 3-4  Severe Pain = Pain Score of 7-10, CPOT 5-8     1545     1552-D/C'd         ondansetron (ZOFRAN) tablet 8 mg  Dose: 8 mg Freq: Every 8 Hours PRN Route: PO  PRN Reasons: Nausea,Vomiting  Start: 08/15/17 1530         oxyCODONE-acetaminophen (PERCOCET) 5-325 MG per tablet 2 tablet  Dose: 2 tablet Freq: Every 4 Hours PRN Route: PO  PRN Reason: Moderate Pain   PRN Comment: every 4-6 hours PRN  Start: 08/15/17 1528 End: 08/25/17 1527    Admin Instructions:   1-2 tablets  Do not exceed 4 grams of acetaminophen in a 24 hr period.    If given for pain, use the following pain scale:   Mild Pain = Pain Score of 1-3, CPOT 1-2  Moderate Pain = Pain Score of 4-6, CPOT 3-4  Severe Pain = Pain Score of 7-10, CPOT 5-8         sodium chloride 0.9 % flush 10 mL  Dose: 10 mL Freq: As Needed Route: IV  PRN Reason: Line  Care  Start: 08/15/17 0934 End: 08/15/17 1321     1321-D/C'd           Medications 08/14/17 08/15/17 08/16/17                  Physician Progress Notes (all)      Jesus Grande MD at 8/16/2017  7:49 AM  Version 1 of 1         Symptomatically the patient looks about the same today.  Limited ability to take by mouth complaining of significant throat and neck pain.  BC showed improvement however.  Her white count dropped to 14,000 and fever max at 99.  I did review her CT scan.  There appears to be sided peritonsillar phlegmon versus small abscess.  She is just at 24 hours on her IV antibiotics.  I would like to continue these at least another 24 hours before making a surgical decision.  Hopefully we will be able to treat this nonsurgically and get her to recover quicker.  We'll continue to manage her symptomatically with pain medication and IV fluids.  I'll check another white count in the morning.  Hopefully we will see some symptomatic improvement.  If condition worsens and we'll need to proceed with a quinsy tonsillectomy which will would result in some significant recovery time as well as risk of perioperative and postoperative bleeding.  His goal exam is essentially unchanged since the office yesterday afternoon.  Sterile with significant left-sided periorbital edema as well as tender adenopathy on the left.   Electronically signed by Jesus Grande MD at 8/16/2017  7:51 AM

## 2017-08-16 NOTE — PLAN OF CARE
Problem: Patient Care Overview (Adult)  Goal: Plan of Care Review  Outcome: Ongoing (interventions implemented as appropriate)    08/16/17 0317   Coping/Psychosocial Response Interventions   Plan Of Care Reviewed With patient   Patient Care Overview   Progress improving   Outcome Evaluation   Outcome Summary/Follow up Plan Swelling of left side of face still visible, on IV antibx, IVF maintained, pain on and off, better early AM, offered pain meds but refused at one point, up with assist to bathroom, talked with ENT on-call yesterday and they said they are going to see patient in Am, NSR in monitor, VSS, no fever, will monitor       Goal: Adult Individualization and Mutuality  Outcome: Ongoing (interventions implemented as appropriate)    08/16/17 0317   Individualization   Patient Specific Goals pain control and treat infection and swelling   Patient Specific Interventions given pain meds as needed, IVantibx maintained         Problem: Pain, Acute (Adult)  Goal: Acceptable Pain Control/Comfort Level  Outcome: Ongoing (interventions implemented as appropriate)    08/16/17 0317   Pain, Acute (Adult)   Acceptable Pain Control/Comfort Level making progress toward outcome         Problem: Fall Risk (Adult)  Goal: Identify Related Risk Factors and Signs and Symptoms  Outcome: Ongoing (interventions implemented as appropriate)    08/16/17 0317   Fall Risk   Fall Risk: Related Risk Factors fatigue/slow reaction   Fall Risk: Signs and Symptoms presence of risk factors       Goal: Absence of Falls  Outcome: Ongoing (interventions implemented as appropriate)    08/16/17 0317   Fall Risk (Adult)   Absence of Falls making progress toward outcome         Problem: Infection, Risk/Actual (Adult)  Goal: Identify Related Risk Factors and Signs and Symptoms  Outcome: Ongoing (interventions implemented as appropriate)  Goal: Infection Prevention/Resolution  Outcome: Ongoing (interventions implemented as appropriate)    08/16/17 0317    Infection, Risk/Actual (Adult)   Infection Prevention/Resolution making progress toward outcome

## 2017-08-16 NOTE — PLAN OF CARE
Problem: Patient Care Overview (Adult)  Goal: Plan of Care Review  Outcome: Ongoing (interventions implemented as appropriate)    08/16/17 0644   Coping/Psychosocial Response Interventions   Plan Of Care Reviewed With patient   Patient Care Overview   Progress improving   Outcome Evaluation   Outcome Summary/Follow up Plan med for throat/neck pain with good results, only able to take sips of liquids due to pain, pt does state she feels like she is getting a little better, will cont to monitor       Goal: Adult Individualization and Mutuality  Outcome: Ongoing (interventions implemented as appropriate)    Problem: Pain, Acute (Adult)  Goal: Acceptable Pain Control/Comfort Level  Outcome: Ongoing (interventions implemented as appropriate)    Problem: Fall Risk (Adult)  Goal: Identify Related Risk Factors and Signs and Symptoms  Outcome: Ongoing (interventions implemented as appropriate)  Goal: Absence of Falls  Outcome: Ongoing (interventions implemented as appropriate)    Problem: Infection, Risk/Actual (Adult)  Goal: Identify Related Risk Factors and Signs and Symptoms  Outcome: Ongoing (interventions implemented as appropriate)  Goal: Infection Prevention/Resolution  Outcome: Ongoing (interventions implemented as appropriate)

## 2017-08-16 NOTE — PROGRESS NOTES
Discharge Planning Assessment  [facility]     Patient Name: Cara Eisenberg  MRN: [id number]  Today's Date: 8/16/2017    Admit Date: 8/15/2017          Discharge Needs Assessment       08/16/17 1557    Living Environment    Lives With spouse    Living Arrangements house    Home Accessibility no concerns    Transportation Available car;family or friend will provide    Living Environment    Able to Return to Prior Living Arrangements yes    Discharge Needs Assessment    Concerns To Be Addressed no discharge needs identified;denies needs/concerns at this time    Readmission Within The Last 30 Days no previous admission in last 30 days    Equipment Currently Used at Home none    Equipment Needed After Discharge none            Discharge Plan       08/16/17 1558    Case Management/Social Work Plan    Plan Home     Additional Comments Spoke with pt at bedside. Facesheet info verified. Role of CCP explained. Pt has never used  or been to [facility]. Pt plans home and denies any discharge planning needs at this time. CCP to follow.         Discharge Placement     No information found                Demographic Summary     None            Functional Status     None            Psychosocial     None            Abuse/Neglect     None            Legal     None            Substance Abuse     None            Patient Forms     None          Sridevi Watt RN

## 2017-08-16 NOTE — PROGRESS NOTES
Symptomatically the patient looks about the same today.  Limited ability to take by mouth complaining of significant throat and neck pain.  BC showed improvement however.  Her white count dropped to 14,000 and fever max at 99.  I did review her CT scan.  There appears to be sided peritonsillar phlegmon versus small abscess.  She is just at 24 hours on her IV antibiotics.  I would like to continue these at least another 24 hours before making a surgical decision.  Hopefully we will be able to treat this nonsurgically and get her to recover quicker.  We'll continue to manage her symptomatically with pain medication and IV fluids.  I'll check another white count in the morning.  Hopefully we will see some symptomatic improvement.  If condition worsens and we'll need to proceed with a quinsy tonsillectomy which will would result in some significant recovery time as well as risk of perioperative and postoperative bleeding.  His goal exam is essentially unchanged since the office yesterday afternoon.  Sterile with significant left-sided periorbital edema as well as tender adenopathy on the left.

## 2017-08-17 LAB
BASOPHILS # BLD AUTO: 0.02 10*3/MM3 (ref 0–0.2)
BASOPHILS NFR BLD AUTO: 0.2 % (ref 0–1.5)
DEPRECATED RDW RBC AUTO: 49.8 FL (ref 37–54)
EOSINOPHIL # BLD AUTO: 0.04 10*3/MM3 (ref 0–0.7)
EOSINOPHIL NFR BLD AUTO: 0.4 % (ref 0.3–6.2)
ERYTHROCYTE [DISTWIDTH] IN BLOOD BY AUTOMATED COUNT: 13.3 % (ref 11.7–13)
HCT VFR BLD AUTO: 35.9 % (ref 35.6–45.5)
HGB BLD-MCNC: 11.6 G/DL (ref 11.9–15.5)
IMM GRANULOCYTES # BLD: 0.03 10*3/MM3 (ref 0–0.03)
IMM GRANULOCYTES NFR BLD: 0.3 % (ref 0–0.5)
LYMPHOCYTES # BLD AUTO: 2.09 10*3/MM3 (ref 0.9–4.8)
LYMPHOCYTES NFR BLD AUTO: 18.5 % (ref 19.6–45.3)
MCH RBC QN AUTO: 32.9 PG (ref 26.9–32)
MCHC RBC AUTO-ENTMCNC: 32.3 G/DL (ref 32.4–36.3)
MCV RBC AUTO: 101.7 FL (ref 80.5–98.2)
MONOCYTES # BLD AUTO: 1.21 10*3/MM3 (ref 0.2–1.2)
MONOCYTES NFR BLD AUTO: 10.7 % (ref 5–12)
NEUTROPHILS # BLD AUTO: 7.88 10*3/MM3 (ref 1.9–8.1)
NEUTROPHILS NFR BLD AUTO: 69.9 % (ref 42.7–76)
PLATELET # BLD AUTO: 237 10*3/MM3 (ref 140–500)
PMV BLD AUTO: 9.5 FL (ref 6–12)
RBC # BLD AUTO: 3.53 10*6/MM3 (ref 3.9–5.2)
WBC NRBC COR # BLD: 11.27 10*3/MM3 (ref 4.5–10.7)

## 2017-08-17 PROCEDURE — 25810000003 SODIUM CHLORIDE 0.9 % WITH KCL 20 MEQ 20-0.9 MEQ/L-% SOLUTION: Performed by: OTOLARYNGOLOGY

## 2017-08-17 PROCEDURE — 92610 EVALUATE SWALLOWING FUNCTION: CPT | Performed by: SPEECH-LANGUAGE PATHOLOGIST

## 2017-08-17 PROCEDURE — 25010000002 MORPHINE PER 10 MG: Performed by: OTOLARYNGOLOGY

## 2017-08-17 PROCEDURE — 85025 COMPLETE CBC W/AUTO DIFF WBC: CPT | Performed by: OTOLARYNGOLOGY

## 2017-08-17 RX ADMIN — CLINDAMYCIN PHOSPHATE 900 MG: 18 INJECTION, SOLUTION INTRAMUSCULAR; INTRAVENOUS at 16:37

## 2017-08-17 RX ADMIN — POTASSIUM CHLORIDE AND SODIUM CHLORIDE 110 ML/HR: 900; 150 INJECTION, SOLUTION INTRAVENOUS at 19:43

## 2017-08-17 RX ADMIN — HYDROCODONE BITARTRATE AND ACETAMINOPHEN 15 ML: 2.5; 108 SOLUTION ORAL at 19:43

## 2017-08-17 RX ADMIN — POTASSIUM CHLORIDE AND SODIUM CHLORIDE 110 ML/HR: 900; 150 INJECTION, SOLUTION INTRAVENOUS at 18:08

## 2017-08-17 RX ADMIN — CLINDAMYCIN PHOSPHATE 900 MG: 18 INJECTION, SOLUTION INTRAMUSCULAR; INTRAVENOUS at 10:14

## 2017-08-17 RX ADMIN — CLINDAMYCIN PHOSPHATE 900 MG: 18 INJECTION, SOLUTION INTRAMUSCULAR; INTRAVENOUS at 03:22

## 2017-08-17 RX ADMIN — CLINDAMYCIN PHOSPHATE 900 MG: 18 INJECTION, SOLUTION INTRAMUSCULAR; INTRAVENOUS at 21:46

## 2017-08-17 RX ADMIN — MORPHINE SULFATE 4 MG: 4 INJECTION, SOLUTION INTRAMUSCULAR; INTRAVENOUS at 03:20

## 2017-08-17 RX ADMIN — HYDROCODONE BITARTRATE AND ACETAMINOPHEN 15 ML: 2.5; 108 SOLUTION ORAL at 10:49

## 2017-08-17 NOTE — THERAPY EVALUATION
Acute Care - Speech Language Pathology   Swallow Initial Evaluation [facility]     Patient Name: Cara Eisenberg  : 1970  MRN: [id number]  Today's Date: 2017               Admit Date: 8/15/2017    SPEECH-LANGUAGE PATHOLOGY EVALUATION - SWALLOW  Subjective: The patient was seen on this date for a Clinical Swallow evaluation.  Patient was alert and cooperative.    The patient's history is significant for Peritonsillar abscess. Per patient and MD report, some improvement today with patient now tolerating clear liquids. Patient is able to manage and swallow secretions and reporting no difficulty swallowing liquids.   Objective: Textures given included thin liquid and puree consistency.  Assessment: Difficulties were noted with none of the above consistencies, characterized by no overt s/s of pen/asp. Patient did demonstrate multiple swallow at times, timely swallow initiation and laryngeal elevation felt adequate upon neck palpation.  SLP Findings:  Patient presents with risk for oropharyngeal dysphagia.  Recommendations: Diet Textures: thin liquid, puree consistency food.  Medications should be taken crushed with puree.  Recommended Strategies: Upright for PO and small bites and sips. Oral care before breakfast, after all meals and PRN.  Other Recommended Evaluations: Re-evaluation at bedside    Dysphagia therapy is recommended. Rationale: diet tolerance.  Visit Dx:   No diagnosis found.  Patient Active Problem List   Diagnosis   • Peritonsillar abscess     Past Medical History:   Diagnosis Date   • Abnormal uterine bleeding    • Back pain    • Uterine fibroid     uterine and ovarian nodules     Past Surgical History:   Procedure Laterality Date   • D&C HYSTEROSCOPY N/A 2016    Procedure: DILATATION AND CURETTAGE HYSTEROSCOPY;  Surgeon: Suzy Chino MD;  Location: [facility] OR;  Service:    • DILATATION AND CURETTAGE     • TUBAL ABDOMINAL LIGATION            SWALLOW EVALUATION (last 72  hours)      Swallow Evaluation       08/17/17 1500                Rehab Evaluation    Document Type evaluation  -KA        Subjective Information no complaints  -KA        Patient Effort, Rehab Treatment excellent  -KA        Symptoms Noted During/After Treatment none  -KA        General Information    Patient Profile Review yes  -KA        Current Diet Limitations other (see comments)   clear liquids  -KA        Prior Level of Function- Swallowing no diet consistency restrictions;safe, efficient swallowing in all situations  -KA        Plans/Goals Discussed With patient  -KA        Barriers to Rehab none identified  -KA        Clinical Impression    Patient's Goals For Discharge return home;return to PO diet  -KA        SLP Swallowing Diagnosis mild dysphagia  -KA        Rehab Potential/Prognosis, Swallowing good, to achieve stated therapy goals  -KA        Criteria for Skilled Therapeutic Interventions Met skilled criteria for dysphagia intervention met  -KA        Therapy Frequency PRN  -KA        Predicted Duration Therapy Interv (days) until discharge  -KA        Expected Duration Therapy Session (min) 15-30 minutes  -KA        SLP Diet Recommendation other (see comments)   continue current diet per MD  -KA        Recommended Diagnostics reassess via clinical swallow (non-instrumental exam)  -KA        Recommended Feeding/Eating Techniques small sips/bites  -KA        SLP Rec. for Method of Medication Administration meds via alternate route  -KA        Monitor For Signs Of Aspiration pneumonia;right lower lobe infiltrates  -KA        Anticipated Discharge Disposition home  -KA        Oral Motor Structure and Function    Oral Motor Anatomy and Physiology patient demonstrates anatomy that is WNL  -KA        Dentition Assessment present and adequate  -KA        Secretion Management WNL/WFL  -KA        Volitional Swallow no difficulties initiating volitional swallow  -KA        Volitional Cough no difficulties  initiating volitional cough  -ABNER        Oral Musculature General Assessment WNL (within normal limits)  -ABNER          User Key  (r) = Recorded By, (t) = Taken By, (c) = Cosigned By    Initials Name Effective Dates    ABNER House MA,CCC-SLP 04/13/15 -         EDUCATION  The patient has been educated in the following areas:   Dysphagia (Swallowing Impairment).    SLP Recommendation and Plan  SLP Swallowing Diagnosis: mild dysphagia  SLP Diet Recommendation: other (see comments) (continue current diet per MD)  Recommended Feeding/Eating Techniques: small sips/bites  SLP Rec. for Method of Medication Administration: meds via crushed with puree  Monitor For Signs Of Aspiration: pneumonia, right lower lobe infiltrates  Recommended Diagnostics: reassess via clinical swallow (non-instrumental exam)  Criteria for Skilled Therapeutic Interventions Met: skilled criteria for dysphagia intervention met  Anticipated Discharge Disposition: home  Rehab Potential/Prognosis, Swallowing: good, to achieve stated therapy goals  Therapy Frequency: PRN                        IP SLP Goals       08/17/17 1543          Safely Consume Diet    Safely Consume Diet- SLP, Date Established 08/17/17  -KA      Safely Consume Diet- SLP, Time to Achieve by discharge  -ABNER        User Key  (r) = Recorded By, (t) = Taken By, (c) = Cosigned By    Initials Name Provider Type    ABNER House MA,CCC-SLP Speech and Language Pathologist             SLP Outcome Measures (last 72 hours)      SLP Outcome Measures       08/17/17 1500          SLP Outcome Measures    Outcome Measure Used? Adult NOMS  -ABNER      FCM Scores    FCM Chosen Swallowing  -ABNER      Swallowing FCM Score 4  -ABNER        User Key  (r) = Recorded By, (t) = Taken By, (c) = Cosigned By    Initials Name Effective Dates    ABNER House MA,CCC-SLP 04/13/15 -            Time Calculation:         Time Calculation- SLP       08/17/17 1544          Time Calculation- SLP    SLP Start Time 1330   -ABNER      SLP Stop Time 1415  -      SLP Time Calculation (min) 45 min  -ABNER      SLP Received On 08/17/17  -ABNER        User Key  (r) = Recorded By, (t) = Taken By, (c) = Cosigned By    Initials Name Provider Type    ABNER House MA,CCC-SLP Speech and Language Pathologist          Therapy Charges for Today     Code Description Service Date Service Provider Modifiers Qty    [id number] HC ST EVAL ORAL PHARYNG SWALLOW 3 8/17/2017 Darnell House MA,CCC-SLP GN 1               Darnell House MA,WENDIE-SLP  8/17/2017

## 2017-08-17 NOTE — PLAN OF CARE
Problem: Inpatient SLP  Goal: Dysphagia- Patient will safely consume diet as per recommendation with no signs/symptoms of aspiration    08/17/17 1543   Safely Consume Diet   Safely Consume Diet- SLP, Date Established 08/17/17   Safely Consume Diet- SLP, Time to Achieve by discharge

## 2017-08-17 NOTE — PLAN OF CARE
Problem: Patient Care Overview (Adult)  Goal: Plan of Care Review  Outcome: Ongoing (interventions implemented as appropriate)    08/17/17 1531   Coping/Psychosocial Response Interventions   Plan Of Care Reviewed With patient   Patient Care Overview   Progress improving   Outcome Evaluation   Outcome Summary/Follow up Plan pt states she is able to swallow more today compared to yesterday, pain under control, pt ambulated in room , sat in chair most of day, will cont to monitor       Goal: Adult Individualization and Mutuality  Outcome: Ongoing (interventions implemented as appropriate)    Problem: Pain, Acute (Adult)  Goal: Acceptable Pain Control/Comfort Level  Outcome: Ongoing (interventions implemented as appropriate)    Problem: Fall Risk (Adult)  Goal: Absence of Falls  Outcome: Ongoing (interventions implemented as appropriate)    Problem: Infection, Risk/Actual (Adult)  Goal: Infection Prevention/Resolution  Outcome: Ongoing (interventions implemented as appropriate)

## 2017-08-17 NOTE — PROGRESS NOTES
Patient is subjectively improved this morning.  She reports reduced neck pain improved mobility and some decreased throat pain.  Continues to have poor by mouth intake.  Has remained afebrile below 99.  Also white count continues to drop down to 11.  Physical exam she still has significant fullness of the left peritonsillar area but less erythema.  Less tender adenopathy.  At this point I recommended continued antibiotics.  Hopefully quinsy tonsillectomy.  I encouraged increased activity to walking the halls today also like her to try to switch to by mouth Percocet continue morphine.  She only required 2 or 3 doses yesterday.  I will round tomorrow in the afternoon and if she is able to take by mouth I can consider discharging her for the weekend.

## 2017-08-17 NOTE — PLAN OF CARE
Problem: Patient Care Overview (Adult)  Goal: Plan of Care Review  Outcome: Ongoing (interventions implemented as appropriate)    08/17/17 0334   Coping/Psychosocial Response Interventions   Plan Of Care Reviewed With patient   Patient Care Overview   Progress no change   Outcome Evaluation   Outcome Summary/Follow up Plan Still says she cannot swallow any liquids due to pain. C/O headache in back of head after sleeping with head down some. Low grade temps.       Goal: Adult Individualization and Mutuality  Outcome: Ongoing (interventions implemented as appropriate)  Goal: Discharge Needs Assessment  Outcome: Ongoing (interventions implemented as appropriate)    Problem: Pain, Acute (Adult)  Goal: Acceptable Pain Control/Comfort Level  Outcome: Ongoing (interventions implemented as appropriate)    Problem: Fall Risk (Adult)  Goal: Identify Related Risk Factors and Signs and Symptoms  Outcome: Outcome(s) achieved Date Met:  08/17/17  Goal: Absence of Falls  Outcome: Ongoing (interventions implemented as appropriate)    Problem: Infection, Risk/Actual (Adult)  Goal: Identify Related Risk Factors and Signs and Symptoms  Outcome: Outcome(s) achieved Date Met:  08/17/17  Goal: Infection Prevention/Resolution  Outcome: Ongoing (interventions implemented as appropriate)

## 2017-08-18 VITALS
HEIGHT: 68 IN | HEART RATE: 68 BPM | TEMPERATURE: 98.7 F | SYSTOLIC BLOOD PRESSURE: 118 MMHG | RESPIRATION RATE: 12 BRPM | DIASTOLIC BLOOD PRESSURE: 67 MMHG | OXYGEN SATURATION: 96 % | WEIGHT: 175.04 LBS | BODY MASS INDEX: 26.53 KG/M2

## 2017-08-18 PROBLEM — J36 PERITONSILLAR ABSCESS: Status: RESOLVED | Noted: 2017-08-16 | Resolved: 2017-08-18

## 2017-08-18 LAB
BASOPHILS # BLD AUTO: 0.04 10*3/MM3 (ref 0–0.2)
BASOPHILS NFR BLD AUTO: 0.5 % (ref 0–1.5)
DEPRECATED RDW RBC AUTO: 47.4 FL (ref 37–54)
EOSINOPHIL # BLD AUTO: 0.1 10*3/MM3 (ref 0–0.7)
EOSINOPHIL NFR BLD AUTO: 1.3 % (ref 0.3–6.2)
ERYTHROCYTE [DISTWIDTH] IN BLOOD BY AUTOMATED COUNT: 13 % (ref 11.7–13)
HCT VFR BLD AUTO: 35.5 % (ref 35.6–45.5)
HGB BLD-MCNC: 11.6 G/DL (ref 11.9–15.5)
IMM GRANULOCYTES # BLD: 0.03 10*3/MM3 (ref 0–0.03)
IMM GRANULOCYTES NFR BLD: 0.4 % (ref 0–0.5)
LYMPHOCYTES # BLD AUTO: 1.63 10*3/MM3 (ref 0.9–4.8)
LYMPHOCYTES NFR BLD AUTO: 21.6 % (ref 19.6–45.3)
MCH RBC QN AUTO: 32.6 PG (ref 26.9–32)
MCHC RBC AUTO-ENTMCNC: 32.7 G/DL (ref 32.4–36.3)
MCV RBC AUTO: 99.7 FL (ref 80.5–98.2)
MONOCYTES # BLD AUTO: 0.77 10*3/MM3 (ref 0.2–1.2)
MONOCYTES NFR BLD AUTO: 10.2 % (ref 5–12)
NEUTROPHILS # BLD AUTO: 4.97 10*3/MM3 (ref 1.9–8.1)
NEUTROPHILS NFR BLD AUTO: 66 % (ref 42.7–76)
PLATELET # BLD AUTO: 270 10*3/MM3 (ref 140–500)
PMV BLD AUTO: 9.5 FL (ref 6–12)
RBC # BLD AUTO: 3.56 10*6/MM3 (ref 3.9–5.2)
WBC NRBC COR # BLD: 7.54 10*3/MM3 (ref 4.5–10.7)

## 2017-08-18 PROCEDURE — 85025 COMPLETE CBC W/AUTO DIFF WBC: CPT | Performed by: OTOLARYNGOLOGY

## 2017-08-18 RX ORDER — CLINDAMYCIN HYDROCHLORIDE 300 MG/1
300 CAPSULE ORAL 4 TIMES DAILY
Qty: 40 CAPSULE | Refills: 0 | Status: SHIPPED | OUTPATIENT
Start: 2017-08-18 | End: 2018-05-17

## 2017-08-18 RX ADMIN — CLINDAMYCIN PHOSPHATE 900 MG: 18 INJECTION, SOLUTION INTRAMUSCULAR; INTRAVENOUS at 04:57

## 2017-08-18 RX ADMIN — CLINDAMYCIN PHOSPHATE 900 MG: 18 INJECTION, SOLUTION INTRAMUSCULAR; INTRAVENOUS at 10:17

## 2017-08-18 RX ADMIN — HYDROCODONE BITARTRATE AND ACETAMINOPHEN 15 ML: 2.5; 108 SOLUTION ORAL at 05:50

## 2017-08-18 NOTE — PROGRESS NOTES
Continued Stay Note  [facility]     Patient Name: Cara Eisenberg  MRN: [id number]  Today's Date: 8/18/2017    Admit Date: 8/15/2017          Discharge Plan       08/18/17 1618    Case Management/Social Work Plan    Plan Home     Additional Comments Spoke with pt at bedside, she plans home and denies any discharge planning needs at this time. Pt plans to be discharged today. CCP to follow.               Discharge Codes     None            Sridevi Watt RN

## 2017-08-18 NOTE — SIGNIFICANT NOTE
08/18/17 1504   Rehab Treatment   Discipline speech language pathologist   Rehab Evaluation   Evaluation Not Performed other (see comments)  (RN reports pt tolerating clear liquids and does not want to upgrade diet at this time.  Plan for d/c today.)

## 2017-08-18 NOTE — PLAN OF CARE
Problem: Patient Care Overview (Adult)  Goal: Plan of Care Review  Outcome: Ongoing (interventions implemented as appropriate)    08/18/17 0435   Coping/Psychosocial Response Interventions   Plan Of Care Reviewed With patient   Patient Care Overview   Progress improving   Outcome Evaluation   Outcome Summary/Follow up Plan Probable DC today. Speach better. Able to take PO liquid pain meds as needed.       Goal: Adult Individualization and Mutuality  Outcome: Ongoing (interventions implemented as appropriate)  Goal: Discharge Needs Assessment  Outcome: Ongoing (interventions implemented as appropriate)    Problem: Pain, Acute (Adult)  Goal: Acceptable Pain Control/Comfort Level  Outcome: Ongoing (interventions implemented as appropriate)    Problem: Fall Risk (Adult)  Goal: Absence of Falls  Outcome: Ongoing (interventions implemented as appropriate)    Problem: Infection, Risk/Actual (Adult)  Goal: Infection Prevention/Resolution  Outcome: Ongoing (interventions implemented as appropriate)

## 2017-08-21 NOTE — PAYOR COMM NOTE
"Cara Eisenberg (47 y.o. Female)     PLEASE SEE ATTACHED DC SUMMARY    REF#[id number]    THANK YOU    NERIS JEFFRIES LPN, CCP    Date of Birth Social Security Number Address Home Phone MRN    1970  [address] [place] [id number] [phone] [id number]    Judaism Marital Status          None        Admission Date Admission Type Admitting Provider Attending Provider Department, Room/Bed    8/15/17 Elective Jesus Grande MD  [address], 632/1    Discharge Date Discharge Disposition Discharge Destination        8/18/2017 Home or Self Care             Attending Provider: (none)    Allergies:  Penicillins    Isolation:  None   Infection:  None   Code Status:  Prior    Ht:  67.99\" (172.7 cm)   Wt:  175 lb 0.7 oz (79.4 kg)    Admission Cmt:  None   Principal Problem:  None                Active Insurance as of 8/15/2017     Primary Coverage     Payor Plan Insurance Group Employer/Plan Group    [employer] BLUE [employer]OSS [employer] BLUE CROSS BLUE [employer]IELD PPO [id number]     Payor Plan Address Payor Plan Phone Number Effective From Effective To    [address] [phone] 10/1/2015     [place] [address]       Subscriber Name Subscriber Birth Date Member ID       CARA EISENBERG 1970 [id number]                 Emergency Contacts      (Rel.) Home Phone Work Phone Mobile Phone    Michi Eisenberg (Spouse) [phone] -- [phone]               Discharge Summary      Jesus Grande MD at 8/18/2017  4:33 PM          Patient feels remarkably better today has been afebrile taking by mouth well white count normalizing.  Physical exam shows dramatic improvement and oropharyngeal edema and erythema neck exam is also markedly better with less tender adenopathy.  Remainder of the physical exam is within normal limits think at this point we can discontinue her IV antibiotics for switch her over to by mouth clindamycin 300 mg 4 times a day push fluids and rest " and I will follow her up in the office on Wednesday we briefly discussed potential for surgical intervention and/or tonsillectomy but would like to delay this is long as possible     Electronically signed by Jesus Grande MD at 8/18/2017  4:34 PM

## 2018-05-15 ENCOUNTER — TRANSCRIBE ORDERS (OUTPATIENT)
Dept: ADMINISTRATIVE | Facility: HOSPITAL | Age: 48
End: 2018-05-15

## 2018-05-15 DIAGNOSIS — Z12.39 SCREENING BREAST EXAMINATION: Primary | ICD-10-CM

## 2018-05-16 ENCOUNTER — OFFICE VISIT (OUTPATIENT)
Dept: OBSTETRICS AND GYNECOLOGY | Facility: CLINIC | Age: 48
End: 2018-05-16

## 2018-05-16 VITALS
SYSTOLIC BLOOD PRESSURE: 110 MMHG | BODY MASS INDEX: 27.62 KG/M2 | DIASTOLIC BLOOD PRESSURE: 74 MMHG | WEIGHT: 176 LBS | HEIGHT: 67 IN

## 2018-05-16 DIAGNOSIS — Z01.419 PAP SMEAR, LOW-RISK: ICD-10-CM

## 2018-05-16 DIAGNOSIS — N95.0 POSTMENOPAUSAL BLEEDING: ICD-10-CM

## 2018-05-16 DIAGNOSIS — Z01.419 WELL WOMAN EXAM WITH ROUTINE GYNECOLOGICAL EXAM: Primary | ICD-10-CM

## 2018-05-16 DIAGNOSIS — Z80.9 FAMILY HISTORY OF CANCER: ICD-10-CM

## 2018-05-16 LAB
BASOPHILS # BLD AUTO: 0.06 10*3/MM3 (ref 0–0.2)
BASOPHILS NFR BLD AUTO: 0.8 % (ref 0–2)
BILIRUB BLD-MCNC: NEGATIVE MG/DL
CLARITY, POC: CLEAR
COLOR UR: YELLOW
EOSINOPHIL # BLD AUTO: 0.06 10*3/MM3 (ref 0.1–0.3)
EOSINOPHIL NFR BLD AUTO: 0.8 % (ref 0–4)
ERYTHROCYTE [DISTWIDTH] IN BLOOD BY AUTOMATED COUNT: 12.4 % (ref 11.5–14.5)
GLUCOSE UR STRIP-MCNC: NEGATIVE MG/DL
HCT VFR BLD AUTO: 40.1 % (ref 37–47)
HGB BLD-MCNC: 13.3 G/DL (ref 12–16)
IMM GRANULOCYTES # BLD: 0.01 10*3/MM3 (ref 0–0.03)
IMM GRANULOCYTES NFR BLD: 0.1 % (ref 0–0.5)
KETONES UR QL: NEGATIVE
LEUKOCYTE EST, POC: NEGATIVE
LYMPHOCYTES # BLD AUTO: 2.5 10*3/MM3 (ref 0.6–4.8)
LYMPHOCYTES NFR BLD AUTO: 33.3 % (ref 20–45)
MCH RBC QN AUTO: 32.3 PG (ref 27–31)
MCHC RBC AUTO-ENTMCNC: 33.2 G/DL (ref 31–37)
MCV RBC AUTO: 97.3 FL (ref 81–99)
MONOCYTES # BLD AUTO: 0.58 10*3/MM3 (ref 0–1)
MONOCYTES NFR BLD AUTO: 7.7 % (ref 3–8)
NEUTROPHILS # BLD AUTO: 4.29 10*3/MM3 (ref 1.5–8.3)
NEUTROPHILS NFR BLD AUTO: 57.3 % (ref 45–70)
NITRITE UR-MCNC: NEGATIVE MG/ML
NRBC BLD AUTO-RTO: 0 /100 WBC (ref 0–0)
PH UR: 6.5 [PH] (ref 5–8)
PLATELET # BLD AUTO: 319 10*3/MM3 (ref 140–500)
PROT UR STRIP-MCNC: NEGATIVE MG/DL
RBC # BLD AUTO: 4.12 10*6/MM3 (ref 4.2–5.4)
RBC # UR STRIP: NEGATIVE /UL
SP GR UR: 1.01 (ref 1–1.03)
TSH SERPL DL<=0.005 MIU/L-ACNC: 1.91 MIU/ML (ref 0.27–4.2)
UROBILINOGEN UR QL: NORMAL
WBC # BLD AUTO: 7.5 10*3/MM3 (ref 4.8–10.8)

## 2018-05-16 PROCEDURE — 99396 PREV VISIT EST AGE 40-64: CPT | Performed by: NURSE PRACTITIONER

## 2018-05-16 PROCEDURE — 99213 OFFICE O/P EST LOW 20 MIN: CPT | Performed by: NURSE PRACTITIONER

## 2018-05-16 PROCEDURE — 81002 URINALYSIS NONAUTO W/O SCOPE: CPT | Performed by: NURSE PRACTITIONER

## 2018-05-16 NOTE — PROGRESS NOTES
GYN Annual Exam     Chief Complaint   Patient presents with   • Gynecologic Exam   • Vaginal Bleeding     5/10/18 heavy vaginal bleeding , not had menses in 17 years       Cara Eisenberg is a 47 y.o. female who presents for annual well woman exam. She is an established patient of Dr. Chino. She states she stopped having periods at age 31. Last week on Thursday, out of no where, she started having heavy bleeding that turned into passing of clots. States the bleeding was so heavy she began feeling weak and had to rest. She does report minimal discomfort when the bleeding occurred. The bleeding has slowed. She did have a hysteroscopy D&C, myosure with Dr. Chino in . Does SBE. MMG= 3/16. Dexa= never. Colonoscopy= approx 3-4 years ago, normal.     OB History      Para Term  AB Living    2 2 2          SAB TAB Ectopic Molar Multiple Live Births                         Current contraception: tubal ligation  History of abnormal Pap smear: yes -   Family history of uterine, colon or ovarian cancer: yes - Mother and maternal GM with uterine. Mother with ovarian at age 29 or 30.  Father and paternal GF with colon.   History of abnormal mammogram: yes - in the past   Family history of breast cancer: yes - Maternal aunt x2 breast cancer   Last Pap :     Past Medical History:   Diagnosis Date   • Abnormal uterine bleeding    • Back pain    • Uterine fibroid     uterine and ovarian nodules       Past Surgical History:   Procedure Laterality Date   • D&C HYSTEROSCOPY N/A 2016    Procedure: DILATATION AND CURETTAGE HYSTEROSCOPY;  Surgeon: Suzy Chino MD;  Location: [facility] OR;  Service:    • DILATATION AND CURETTAGE     • TUBAL ABDOMINAL LIGATION           Current Outpatient Prescriptions:   •  clindamycin (CLEOCIN) 300 MG capsule, Take 1 capsule by mouth 4 (Four) Times a Day., Disp: 40 capsule, Rfl: 0    Allergies   Allergen Reactions   • Penicillins Anaphylaxis       Social  "History   Substance Use Topics   • Smoking status: Current Every Day Smoker     Packs/day: 0.25     Years: 30.00   • Smokeless tobacco: Never Used      Comment: Less than a pack a week   • Alcohol use Yes      Comment: occasionally       Family History   Problem Relation Age of Onset   • Breast cancer Maternal Grandmother    • Cervical cancer Maternal Grandmother    • Breast cancer Maternal Aunt    • Colon cancer Father    • Testicular cancer Father    • Leukemia Father    • Hypertension Father    • Stroke Father    • Cervical cancer Mother    • Uterine cancer Mother    • Hypertension Mother    • Brain cancer Sister    • Melanoma Maternal Grandfather        Review of Systems   Constitutional: Negative.    Respiratory: Negative.    Cardiovascular: Negative.    Gastrointestinal: Negative.    Endocrine: Negative.    Genitourinary: Positive for vaginal bleeding.   Musculoskeletal: Negative.    Skin: Negative.    Neurological: Negative.    Psychiatric/Behavioral: Negative.        /74   Ht 170.2 cm (67\")   Wt 79.8 kg (176 lb)   BMI 27.57 kg/m²     Physical Exam   Constitutional: She is oriented to person, place, and time. She appears well-developed and well-nourished.   Neck: Normal range of motion. Neck supple. No thyromegaly present.   Cardiovascular: Normal rate and regular rhythm.    Pulmonary/Chest: Effort normal and breath sounds normal. Right breast exhibits no inverted nipple, no mass, no nipple discharge, no skin change and no tenderness. Left breast exhibits no inverted nipple, no mass, no nipple discharge, no skin change and no tenderness.   Abdominal: Soft. Bowel sounds are normal.   Genitourinary: Rectum normal. Pelvic exam was performed with patient supine. There is no rash, tenderness, lesion or injury on the right labia. There is no rash, tenderness, lesion or injury on the left labia. Uterus is not deviated, not enlarged, not fixed and not tender. Cervix exhibits no motion tenderness, no " discharge and no friability. Right adnexum displays no mass, no tenderness and no fullness. Left adnexum displays no mass, no tenderness and no fullness. No erythema, tenderness or bleeding in the vagina. No foreign body in the vagina. No signs of injury around the vagina. No vaginal discharge found.   Neurological: She is alert and oriented to person, place, and time.   Skin: Skin is warm and dry.   Psychiatric: She has a normal mood and affect. Her behavior is normal.   Vitals reviewed.         Assessment     1) GYN annual well woman exam.   2)  bleeding  3) Family history of cancer      Plan     1) Breast Health - Clinical breast exam & mammogram yearly, Self breast awareness monthly  2) Pap - and HPV collected   3)  Bleeding- Check TVUS, CBC and TSH. Schedule follow up with Dr. Chino.   4) Family history of cancer- Has completed My Risk, will get records from previous chart.   5) Contraception- Tubal   6) STD- Declines   7) Smoking status- non smoker   8) MVI daily   9) Follow up prn and one year      Zahra Olmedo, ZULY  5/16/2018  2:02 PM

## 2018-05-17 ENCOUNTER — PROCEDURE VISIT (OUTPATIENT)
Dept: OBSTETRICS AND GYNECOLOGY | Facility: CLINIC | Age: 48
End: 2018-05-17

## 2018-05-17 VITALS
BODY MASS INDEX: 27.62 KG/M2 | DIASTOLIC BLOOD PRESSURE: 70 MMHG | WEIGHT: 176 LBS | SYSTOLIC BLOOD PRESSURE: 118 MMHG | HEIGHT: 67 IN

## 2018-05-17 DIAGNOSIS — N95.0 POSTMENOPAUSAL BLEEDING: ICD-10-CM

## 2018-05-17 DIAGNOSIS — N93.8 DUB (DYSFUNCTIONAL UTERINE BLEEDING): Primary | ICD-10-CM

## 2018-05-17 DIAGNOSIS — N93.8 DUB (DYSFUNCTIONAL UTERINE BLEEDING): ICD-10-CM

## 2018-05-17 DIAGNOSIS — D25.9 UTERINE LEIOMYOMA, UNSPECIFIED LOCATION: ICD-10-CM

## 2018-05-17 DIAGNOSIS — Z13.9 SCREENING FOR CONDITION: Primary | ICD-10-CM

## 2018-05-17 LAB
B-HCG UR QL: NEGATIVE
INTERNAL NEGATIVE CONTROL: NEGATIVE
INTERNAL POSITIVE CONTROL: POSITIVE
Lab: NORMAL

## 2018-05-17 PROCEDURE — 81025 URINE PREGNANCY TEST: CPT | Performed by: OBSTETRICS & GYNECOLOGY

## 2018-05-17 PROCEDURE — 99213 OFFICE O/P EST LOW 20 MIN: CPT | Performed by: OBSTETRICS & GYNECOLOGY

## 2018-05-17 PROCEDURE — 58100 BIOPSY OF UTERUS LINING: CPT | Performed by: OBSTETRICS & GYNECOLOGY

## 2018-05-17 PROCEDURE — 76830 TRANSVAGINAL US NON-OB: CPT | Performed by: OBSTETRICS & GYNECOLOGY

## 2018-05-17 NOTE — PROGRESS NOTES
"      Cara Eisenberg is a 47 y.o. patient who presents for follow up of   Chief Complaint   Patient presents with   • Procedure     Endo Bx     48 yo est pt here for recurrent  VB. She stopped periods at age 31. She has had 3 episodes of significant  VB since then. She had one episode in the distant past and also had an episode in 5/2016 that required HS D&C and had B9 path. . She had an episode in early May that was very heavy and she passed clots and felt weak. She has a family history of uterine cancer in her mother and maternal grandmother as well. She had genetic screening with [facility] and had + MICHELLE VUCS. Her US today shows 7.8 cm uterus with EL 0.29 cm. She has multiple fibroids that are 1.3 x 1.1 cm, 1.7 x 1.7 cm , 1.39 x 1.1cm, 2.5 x 2.37 cm. We discussed at length that there is no explanation for her recurrent VB and that given her family history of cancer and the recurrence of  VB she should consider hysterectomy . WE will do endo biopsy today and I have given her information on hysterectomy for review.         The following portions of the patient's history were reviewed and updated as appropriate: allergies, current medications and problem list.    Review of Systems   Genitourinary: Positive for menstrual problem, pelvic pain and vaginal bleeding.   All other systems reviewed and are negative.      /70   Ht 170.2 cm (67\")   Wt 79.8 kg (176 lb)   BMI 27.57 kg/m²     Physical Exam   Constitutional: She is oriented to person, place, and time. She appears well-developed and well-nourished.   HENT:   Head: Normocephalic and atraumatic.   Abdominal: Soft. Bowel sounds are normal. She exhibits no distension and no mass. There is tenderness. There is no rebound and no guarding. No hernia.   Genitourinary: Pelvic exam was performed with patient supine. There is no rash, tenderness, lesion or injury on the right labia. There is no rash, tenderness, lesion or injury on the left labia. Uterus is " tender. Uterus is not deviated, not enlarged and not fixed. Cervix exhibits no motion tenderness, no discharge and no friability. Right adnexum displays no mass, no tenderness and no fullness. Left adnexum displays no mass, no tenderness and no fullness. No erythema, tenderness or bleeding in the vagina. No foreign body in the vagina. No signs of injury around the vagina. No vaginal discharge found.   Genitourinary Comments: SEE ENDO BX NOTE   Neurological: She is alert and oriented to person, place, and time.   Skin: Skin is warm and dry.   Psychiatric: She has a normal mood and affect. Her behavior is normal. Judgment and thought content normal.   Nursing note and vitals reviewed.      A/P:  1. Persistent  VB- s/p endo biopsy today. Scant tissue despite good sampling. ADvised her to consider either repeat D&C vs hysterectomy given the recurrent nature of her symptoms and had family history of cancer. Will schedule preop visit in 2 weeks as pt need time to decide what she wants to do.   2. Fibroids.  Assessment/Plan   Cara was seen today for procedure.    Diagnoses and all orders for this visit:    Screening for condition  -     POC Pregnancy, Urine    DUB (dysfunctional uterine bleeding)  -     Reference Histopathology                   No Follow-up on file.      Suzy Chino MD    5/17/18  6:43 PM

## 2018-05-18 LAB
CYTOLOGIST CVX/VAG CYTO: NORMAL
CYTOLOGY CVX/VAG DOC THIN PREP: NORMAL
DX ICD CODE: NORMAL
DX ICD CODE: NORMAL
HIV 1 & 2 AB SER-IMP: NORMAL
HPV I/H RISK 1 DNA CVX QL PROBE+SIG AMP: NEGATIVE
OTHER STN SPEC: NORMAL
PATH REPORT.FINAL DX SPEC: NORMAL
STAT OF ADQ CVX/VAG CYTO-IMP: NORMAL

## 2018-05-20 PROBLEM — Z80.9 FAMILY HISTORY OF CANCER: Status: ACTIVE | Noted: 2018-05-20

## 2018-05-20 PROBLEM — D25.9 UTERINE LEIOMYOMA: Status: ACTIVE | Noted: 2018-05-20

## 2018-05-20 PROBLEM — E28.39 PREMATURE OVARIAN FAILURE: Status: ACTIVE | Noted: 2018-05-20

## 2018-05-20 PROBLEM — N95.0 POSTMENOPAUSAL BLEEDING: Status: ACTIVE | Noted: 2018-05-20

## 2018-05-20 NOTE — PROGRESS NOTES
PROCEDURE NOTE    Procedures      Diagnosis  Post menopausal bleeding              UCG  negative    Menopausal Yes   HRT  negative  Contraception: , BTL     Applying Universal Precautions I properly identified the patient and sought her signature with informed consent.  The reason for the biopsy was discussed.  Using a betadine prep on the cervix the cervix was grasped with a tenaculum and the uterus sounded to  6 cm.  A disposable Pipelle was used to retrieve the specimen by passing it 5 times into the cavity hoping to sample more than one area.    Additional procedures necessary were not necessary  The specimen was labelled and placed in a formalin container.    Instructions were given on vaginal rest, OTC tylenol, Motrin or Aleve, and expected future bleeding.  Resulting and follow up were discussed.    Suzy Chino MD

## 2018-05-23 PROBLEM — Z01.419 WELL WOMAN EXAM WITH ROUTINE GYNECOLOGICAL EXAM: Status: ACTIVE | Noted: 2018-05-23

## 2018-05-23 LAB
DX ICD CODE: NORMAL
DX ICD CODE: NORMAL
PATH REPORT.COMMENTS IMP SPEC: NORMAL
PATH REPORT.FINAL DX SPEC: NORMAL
PATH REPORT.GROSS SPEC: NORMAL
PATH REPORT.SITE OF ORIGIN SPEC: NORMAL
PATHOLOGIST NAME: NORMAL
PAYMENT PROCEDURE: NORMAL

## 2020-02-06 ENCOUNTER — TRANSCRIBE ORDERS (OUTPATIENT)
Dept: ADMINISTRATIVE | Facility: HOSPITAL | Age: 50
End: 2020-02-06

## 2020-02-06 DIAGNOSIS — R07.9 CHEST PAIN, UNSPECIFIED TYPE: Primary | ICD-10-CM

## 2020-12-29 ENCOUNTER — APPOINTMENT (OUTPATIENT)
Dept: GENERAL RADIOLOGY | Facility: HOSPITAL | Age: 50
End: 2020-12-29

## 2020-12-29 ENCOUNTER — HOSPITAL ENCOUNTER (EMERGENCY)
Facility: HOSPITAL | Age: 50
Discharge: HOME | End: 2020-12-29
Attending: EMERGENCY MEDICINE | Admitting: EMERGENCY MEDICINE

## 2020-12-29 VITALS
RESPIRATION RATE: 16 BRPM | DIASTOLIC BLOOD PRESSURE: 61 MMHG | HEART RATE: 78 BPM | WEIGHT: 170 LBS | SYSTOLIC BLOOD PRESSURE: 110 MMHG | TEMPERATURE: 97.7 F | OXYGEN SATURATION: 98 % | BODY MASS INDEX: 25.76 KG/M2 | HEIGHT: 68 IN

## 2020-12-29 DIAGNOSIS — R06.02 SHORTNESS OF BREATH: ICD-10-CM

## 2020-12-29 DIAGNOSIS — R05.9 COUGHING: ICD-10-CM

## 2020-12-29 DIAGNOSIS — U07.1 COVID-19: Primary | ICD-10-CM

## 2020-12-29 LAB
ALBUMIN SERPL-MCNC: 4.1 G/DL (ref 3.5–5.2)
ALBUMIN/GLOB SERPL: 1.2 G/DL
ALP SERPL-CCNC: 262 U/L (ref 39–117)
ALT SERPL W P-5'-P-CCNC: 128 U/L (ref 1–33)
ANION GAP SERPL CALCULATED.3IONS-SCNC: 10.5 MMOL/L (ref 5–15)
AST SERPL-CCNC: 80 U/L (ref 1–32)
BASOPHILS # BLD AUTO: 0.02 10*3/MM3 (ref 0–0.2)
BASOPHILS NFR BLD AUTO: 0.4 % (ref 0–1.5)
BILIRUB SERPL-MCNC: 0.4 MG/DL (ref 0–1.2)
BUN SERPL-MCNC: 11 MG/DL (ref 6–20)
BUN/CREAT SERPL: 12.8 (ref 7–25)
CALCIUM SPEC-SCNC: 8.7 MG/DL (ref 8.6–10.5)
CHLORIDE SERPL-SCNC: 103 MMOL/L (ref 98–107)
CO2 SERPL-SCNC: 24.5 MMOL/L (ref 22–29)
CREAT SERPL-MCNC: 0.86 MG/DL (ref 0.57–1)
D DIMER PPP FEU-MCNC: 0.35 MCGFEU/ML (ref 0–0.46)
DEPRECATED RDW RBC AUTO: 45.8 FL (ref 37–54)
EOSINOPHIL # BLD AUTO: 0.04 10*3/MM3 (ref 0–0.4)
EOSINOPHIL NFR BLD AUTO: 0.8 % (ref 0.3–6.2)
ERYTHROCYTE [DISTWIDTH] IN BLOOD BY AUTOMATED COUNT: 12.8 % (ref 12.3–15.4)
FERRITIN SERPL-MCNC: 476 NG/ML (ref 13–150)
GFR SERPL CREATININE-BSD FRML MDRD: 69 ML/MIN/1.73
GLOBULIN UR ELPH-MCNC: 3.3 GM/DL
GLUCOSE SERPL-MCNC: 113 MG/DL (ref 65–99)
HCT VFR BLD AUTO: 40.7 % (ref 34–46.6)
HGB BLD-MCNC: 13 G/DL (ref 12–15.9)
IMM GRANULOCYTES # BLD AUTO: 0.01 10*3/MM3 (ref 0–0.05)
IMM GRANULOCYTES NFR BLD AUTO: 0.2 % (ref 0–0.5)
LYMPHOCYTES # BLD AUTO: 2.3 10*3/MM3 (ref 0.7–3.1)
LYMPHOCYTES NFR BLD AUTO: 45.8 % (ref 19.6–45.3)
MCH RBC QN AUTO: 31.2 PG (ref 26.6–33)
MCHC RBC AUTO-ENTMCNC: 31.9 G/DL (ref 31.5–35.7)
MCV RBC AUTO: 97.6 FL (ref 79–97)
MONOCYTES # BLD AUTO: 0.47 10*3/MM3 (ref 0.1–0.9)
MONOCYTES NFR BLD AUTO: 9.4 % (ref 5–12)
NEUTROPHILS NFR BLD AUTO: 2.18 10*3/MM3 (ref 1.7–7)
NEUTROPHILS NFR BLD AUTO: 43.4 % (ref 42.7–76)
NRBC BLD AUTO-RTO: 0 /100 WBC (ref 0–0.2)
NT-PROBNP SERPL-MCNC: 101.3 PG/ML (ref 0–900)
PLATELET # BLD AUTO: 270 10*3/MM3 (ref 140–450)
PMV BLD AUTO: 9.4 FL (ref 6–12)
POTASSIUM SERPL-SCNC: 3.8 MMOL/L (ref 3.5–5.2)
PROT SERPL-MCNC: 7.4 G/DL (ref 6–8.5)
QT INTERVAL: 393 MS
RBC # BLD AUTO: 4.17 10*6/MM3 (ref 3.77–5.28)
SODIUM SERPL-SCNC: 138 MMOL/L (ref 136–145)
TROPONIN T SERPL-MCNC: <0.01 NG/ML (ref 0–0.03)
WBC # BLD AUTO: 5.02 10*3/MM3 (ref 3.4–10.8)

## 2020-12-29 PROCEDURE — 85379 FIBRIN DEGRADATION QUANT: CPT | Performed by: EMERGENCY MEDICINE

## 2020-12-29 PROCEDURE — 93010 ELECTROCARDIOGRAM REPORT: CPT | Performed by: INTERNAL MEDICINE

## 2020-12-29 PROCEDURE — 85025 COMPLETE CBC W/AUTO DIFF WBC: CPT | Performed by: EMERGENCY MEDICINE

## 2020-12-29 PROCEDURE — 93005 ELECTROCARDIOGRAM TRACING: CPT | Performed by: EMERGENCY MEDICINE

## 2020-12-29 PROCEDURE — 99284 EMERGENCY DEPT VISIT MOD MDM: CPT

## 2020-12-29 PROCEDURE — 94640 AIRWAY INHALATION TREATMENT: CPT

## 2020-12-29 PROCEDURE — 84484 ASSAY OF TROPONIN QUANT: CPT | Performed by: EMERGENCY MEDICINE

## 2020-12-29 PROCEDURE — 71045 X-RAY EXAM CHEST 1 VIEW: CPT

## 2020-12-29 PROCEDURE — 80053 COMPREHEN METABOLIC PANEL: CPT | Performed by: EMERGENCY MEDICINE

## 2020-12-29 PROCEDURE — 82728 ASSAY OF FERRITIN: CPT | Performed by: EMERGENCY MEDICINE

## 2020-12-29 PROCEDURE — 99283 EMERGENCY DEPT VISIT LOW MDM: CPT | Performed by: EMERGENCY MEDICINE

## 2020-12-29 PROCEDURE — 83880 ASSAY OF NATRIURETIC PEPTIDE: CPT | Performed by: EMERGENCY MEDICINE

## 2020-12-29 RX ORDER — ALBUTEROL SULFATE 90 UG/1
2 AEROSOL, METERED RESPIRATORY (INHALATION)
Status: DISCONTINUED | OUTPATIENT
Start: 2020-12-29 | End: 2020-12-29 | Stop reason: HOSPADM

## 2020-12-29 RX ORDER — GUAIFENESIN 600 MG/1
600 TABLET, EXTENDED RELEASE ORAL 2 TIMES DAILY
Qty: 20 TABLET | Refills: 0 | Status: SHIPPED | OUTPATIENT
Start: 2020-12-29 | End: 2021-01-08

## 2020-12-29 RX ORDER — SODIUM CHLORIDE 0.9 % (FLUSH) 0.9 %
10 SYRINGE (ML) INJECTION AS NEEDED
Status: DISCONTINUED | OUTPATIENT
Start: 2020-12-29 | End: 2020-12-29 | Stop reason: HOSPADM

## 2020-12-29 RX ORDER — ALBUTEROL SULFATE 90 UG/1
2 AEROSOL, METERED RESPIRATORY (INHALATION) EVERY 6 HOURS PRN
Qty: 8 G | Refills: 0 | Status: SHIPPED | OUTPATIENT
Start: 2020-12-29 | End: 2021-08-03

## 2020-12-29 RX ADMIN — ALBUTEROL SULFATE 2 PUFF: 90 AEROSOL, METERED RESPIRATORY (INHALATION) at 16:14

## 2020-12-29 RX ADMIN — GUAIFENESIN 200 MG: 200 SOLUTION ORAL at 15:59

## 2020-12-29 RX ADMIN — HYDROCODONE BITARTRATE AND HOMATROPINE METHYLBROMIDE 5 ML: 5; 1.5 SOLUTION ORAL at 15:59

## 2021-09-05 ENCOUNTER — TELEPHONE (OUTPATIENT)
Dept: FAMILY MEDICINE CLINIC | Facility: TELEHEALTH | Age: 51
End: 2021-09-05

## 2021-09-08 ENCOUNTER — OFFICE VISIT (OUTPATIENT)
Dept: FAMILY MEDICINE CLINIC | Facility: CLINIC | Age: 51
End: 2021-09-08

## 2021-09-08 ENCOUNTER — HOSPITAL ENCOUNTER (OUTPATIENT)
Dept: CT IMAGING | Facility: HOSPITAL | Age: 51
Discharge: HOME | End: 2021-09-08
Admitting: FAMILY MEDICINE

## 2021-09-08 VITALS
SYSTOLIC BLOOD PRESSURE: 100 MMHG | HEART RATE: 65 BPM | OXYGEN SATURATION: 97 % | TEMPERATURE: 96.9 F | BODY MASS INDEX: 26.46 KG/M2 | WEIGHT: 174.6 LBS | DIASTOLIC BLOOD PRESSURE: 64 MMHG | HEIGHT: 68 IN

## 2021-09-08 DIAGNOSIS — Z12.31 SCREENING MAMMOGRAM, ENCOUNTER FOR: Primary | ICD-10-CM

## 2021-09-08 DIAGNOSIS — R10.11 RIGHT UPPER QUADRANT ABDOMINAL PAIN: ICD-10-CM

## 2021-09-08 DIAGNOSIS — Z00.01 ENCOUNTER FOR WELL ADULT EXAM WITH ABNORMAL FINDINGS: ICD-10-CM

## 2021-09-08 DIAGNOSIS — Z12.11 ENCOUNTER FOR SCREENING COLONOSCOPY: ICD-10-CM

## 2021-09-08 DIAGNOSIS — R93.5 ABNORMAL CT OF THE ABDOMEN: ICD-10-CM

## 2021-09-08 DIAGNOSIS — Z12.4 PAP SMEAR FOR CERVICAL CANCER SCREENING: ICD-10-CM

## 2021-09-08 DIAGNOSIS — A09 DIARRHEA OF INFECTIOUS ORIGIN: ICD-10-CM

## 2021-09-08 PROCEDURE — 74177 CT ABD & PELVIS W/CONTRAST: CPT

## 2021-09-08 PROCEDURE — 0 IOPAMIDOL PER 1 ML: Performed by: FAMILY MEDICINE

## 2021-09-08 PROCEDURE — 0 DIATRIZOATE MEGLUMINE & SODIUM PER 1 ML: Performed by: FAMILY MEDICINE

## 2021-09-08 PROCEDURE — 99215 OFFICE O/P EST HI 40 MIN: CPT | Performed by: FAMILY MEDICINE

## 2021-09-08 RX ORDER — MULTIPLE VITAMINS W/ MINERALS TAB 9MG-400MCG
1 TAB ORAL DAILY
COMMUNITY

## 2021-09-08 RX ADMIN — DIATRIZOATE MEGLUMINE AND DIATRIZOATE SODIUM 30 ML: 600; 100 SOLUTION ORAL; RECTAL at 12:00

## 2021-09-08 RX ADMIN — IOPAMIDOL 100 ML: 755 INJECTION, SOLUTION INTRAVENOUS at 13:45

## 2021-09-08 NOTE — ADDENDUM NOTE
Addended by: STEFFI BENTLEY JR. on: 9/8/2021 03:53 PM     Modules accepted: Orders, Level of Service

## 2021-09-08 NOTE — PROGRESS NOTES
Chief Complaint  Establish Care, Bleeding/Bruising (Unusual bruising), Abdominal Pain, and Vomiting    Subjective          Cara Eisenberg presents to [facility] for     History of Present Illness    Pt states she has had nausea and vomiting for since last Tuesday. She has been able to keep fluids down. She stopped vomiting last Sunday. She continues to have diarrhea. She went to the [facility] on Thursday. She has not had any blood in her stool. She states she has noticed mucus in her stool. She states the nausea and vomiting resolved a few days ago. She continues to have frequent diarrhea, about 10 episodes per day. Imodium helps a little but makes her very sleepy.    She states she bruises easily.    She states her neighbor friend is a nurse and advised her to check for C. Diff.    She states she had COVID-19 in December 2020. She decided not to get vaccinated for COVID-19.    She has not been on any antibiotics within the past few months.    [facility] but does not swim in the river.    Health Maintenance Due   Topic Date Due   • ANNUAL PHYSICAL  Never done   • COVID-19 Vaccine (1) Never done   • TDAP/TD VACCINES (1 - Tdap) Never done   • HEPATITIS C SCREENING  Never done   • MAMMOGRAM  04/11/2018   • ZOSTER VACCINE (1 of 2) Never done   • PAP SMEAR  05/16/2021        reports that she quit smoking about 9 years ago. She has a 7.50 pack-year smoking history. She has never used smokeless tobacco. She reports current alcohol use. She reports that she does not use drugs.    PHQ-9 Depression Screening  Little interest or pleasure in doing things? 0   Feeling down, depressed, or hopeless? 0   Trouble falling or staying asleep, or sleeping too much?     Feeling tired or having little energy?     Poor appetite or overeating?     Feeling bad about yourself - or that you are a failure or have let yourself or your family down?     Trouble  concentrating on things, such as reading the newspaper or watching television?     Moving or speaking so slowly that other people could have noticed? Or the opposite - being so fidgety or restless that you have been moving around a lot more than usual?     Thoughts that you would be better off dead, or of hurting yourself in some way?     PHQ-9 Total Score 0   If you checked off any problems, how difficult have these problems made it for you to do your work, take care of things at home, or get along with other people?       Lab Results   Component Value Date    WBC 5.02 12/29/2020    HGB 13.0 12/29/2020    HCT 40.7 12/29/2020    MCV 97.6 (H) 12/29/2020     12/29/2020     Lab Results   Component Value Date    GLUCOSE 113 (H) 12/29/2020    BUN 11 12/29/2020    CREATININE 0.86 12/29/2020    EGFRIFNONA 69 12/29/2020    BCR 12.8 12/29/2020    K 3.8 12/29/2020    CO2 24.5 12/29/2020    CALCIUM 8.7 12/29/2020    ALBUMIN 4.10 12/29/2020    AST 80 (H) 12/29/2020     (H) 12/29/2020     Lab Results   Component Value Date    TSH 1.91 05/16/2018     No results found for: HGBA1C  Brief Urine Lab Results     None          BP Readings from Last 12 Encounters:   09/08/21 100/64   09/02/21 111/73   08/03/21 133/84   01/11/21 110/72   12/29/20 110/61   12/28/20 118/75   12/22/20 118/69   05/17/18 118/70   05/16/18 110/74   08/18/17 118/67   08/15/17 105/63   05/17/16 106/59       Wt Readings from Last 12 Encounters:   09/08/21 79.2 kg (174 lb 9.6 oz)   09/02/21 81.6 kg (180 lb)   08/03/21 81.6 kg (180 lb)   01/11/21 77.1 kg (170 lb)   12/29/20 77.1 kg (170 lb)   12/28/20 77.1 kg (170 lb)   12/22/20 81.6 kg (180 lb)   05/17/18 79.8 kg (176 lb)   05/16/18 79.8 kg (176 lb)   08/18/17 79.4 kg (175 lb 0.7 oz)   08/15/17 79.4 kg (175 lb)   05/17/16 82 kg (180 lb 12.8 oz)       Objective   Vital Signs:   /64 (BP Location: Left arm, Patient Position: Sitting, Cuff Size: Adult)   Pulse 65   Temp 96.9 °F (36.1 °C)  "(Temporal)   Ht 172.7 cm (68\")   Wt 79.2 kg (174 lb 9.6 oz)   SpO2 97%   BMI 26.55 kg/m²     Physical Exam  Vitals and nursing note reviewed.   Constitutional:       General: She is not in acute distress.     Appearance: Normal appearance. She is well-developed. She is not diaphoretic.   HENT:      Head: Normocephalic and atraumatic.      Right Ear: External ear normal.      Left Ear: External ear normal.      Nose: Nose normal.      Mouth/Throat:      Pharynx: No oropharyngeal exudate.   Eyes:      General: Lids are normal. No scleral icterus.        Right eye: No discharge.         Left eye: No discharge.   Neck:      Thyroid: No thyromegaly.      Trachea: Trachea normal. No tracheal deviation.   Cardiovascular:      Rate and Rhythm: Normal rate and regular rhythm.      Heart sounds: Normal heart sounds. No murmur heard.   No friction rub. No gallop.    Pulmonary:      Effort: Pulmonary effort is normal. No tachypnea, bradypnea or respiratory distress.      Breath sounds: Normal breath sounds. No stridor. No decreased breath sounds, wheezing or rales.   Chest:      Chest wall: No tenderness.   Abdominal:      Tenderness: There is abdominal tenderness in the right upper quadrant, epigastric area, left upper quadrant and left lower quadrant.       Musculoskeletal:      Cervical back: Full passive range of motion without pain, normal range of motion and neck supple. No edema.   Lymphadenopathy:      Head:      Right side of head: No submental, submandibular, tonsillar, preauricular, posterior auricular or occipital adenopathy.      Left side of head: No submental, submandibular, tonsillar, preauricular, posterior auricular or occipital adenopathy.      Cervical: No cervical adenopathy.      Right cervical: No superficial, deep or posterior cervical adenopathy.     Left cervical: No superficial, deep or posterior cervical adenopathy.   Skin:     General: Skin is warm and dry.      Capillary Refill: Capillary " refill takes less than 2 seconds.      Coloration: Skin is not pale.      Findings: No erythema or rash.      Nails: There is no clubbing.   Neurological:      Mental Status: She is alert and oriented to person, place, and time. She is not disoriented.      Deep Tendon Reflexes: Reflexes are normal and symmetric.   Psychiatric:         Behavior: Behavior normal.        Result Review :                 Assessment and Plan    Diagnoses and all orders for this visit:    1. Screening mammogram, encounter for (Primary)  -     Mammo Screening Bilateral With CAD; Future    2. Encounter for screening colonoscopy  -     Ambulatory Referral For Screening Colonoscopy    3. Diarrhea of infectious origin  -     CT Abdomen Pelvis With Contrast  -     Ova & Parasite Examination - Stool, Per Rectum  -     Clostridium Difficile EIA - Stool, Per Rectum  -     Shiga-like Toxin Antigen, EIA - Stool, Per Rectum  -     Giardia Antigen - Stool, Per Rectum  -     Stool Culture (Reference Lab) - Stool, Per Rectum    4. Right upper quadrant abdominal pain  -     CT Abdomen Pelvis With Contrast  -     Ova & Parasite Examination - Stool, Per Rectum  -     Clostridium Difficile EIA - Stool, Per Rectum  -     Shiga-like Toxin Antigen, EIA - Stool, Per Rectum  -     Giardia Antigen - Stool, Per Rectum  -     Stool Culture (Reference Lab) - Stool, Per Rectum    5. Encounter for well adult exam with abnormal findings  -     CBC & Differential  -     Comprehensive Metabolic Panel  -     Lipid Panel With / Chol / HDL Ratio  -     TSH  -     UA / M With / Rflx Culture([facility] ONLY) - Urine, Clean Catch    6. Pap smear for cervical cancer screening  -     Ambulatory Referral to Obstetrics / Gynecology         Follow Up   Return in about 1 week (around 9/15/2021) for RUQ abdominal pain, diarrhea, CT and stool studiy results.  Patient was given instructions and counseling regarding her condition or for health maintenance advice. Please see specific  information pulled into the AVS if appropriate.     I called and reviewed the CT results with the patient. I encouraged her to drink plenty of water, which she has been doing. She is advised to go to the ER if any symptoms worsen or new symptoms develop.    I spent over 60 minutes with the patient, of which more than 50% was counseling regarding possible etiologies and treatment for her diarrhea and abdominal pain, as well as reviewing and discussing her CT results.

## 2021-09-08 NOTE — PATIENT INSTRUCTIONS
Viral Gastroenteritis, Adult    Viral gastroenteritis is also known as the stomach flu. This condition may affect your stomach, small intestine, and large intestine. It can cause sudden watery diarrhea, fever, and vomiting. This condition is caused by many different viruses. These viruses can be passed from person to person very easily (are contagious).  Diarrhea and vomiting can make you feel weak and cause you to become dehydrated. You may not be able to keep fluids down. Dehydration can make you tired and thirsty, cause you to have a dry mouth, and decrease how often you urinate. It is important to replace the fluids that you lose from diarrhea and vomiting.  What are the causes?  Gastroenteritis is caused by many viruses, including rotavirus and norovirus. Norovirus is the most common cause in adults. You can get sick after being exposed to the viruses from other people. You can also get sick by:  · Eating food, drinking water, or touching a surface contaminated with one of these viruses.  · Sharing utensils or other personal items with an infected person.  What increases the risk?  You are more likely to develop this condition if you:  · Have a weak body defense system (immune system).  · Live with one or more children who are younger than 2 years old.  · Live in a nursing home.  · Travel on cruise ships.  What are the signs or symptoms?  Symptoms of this condition start suddenly 1-3 days after exposure to a virus. Symptoms may last for a few days or for as long as a week. Common symptoms include watery diarrhea and vomiting. Other symptoms include:  · Fever.  · Headache.  · Fatigue.  · Pain in the abdomen.  · Chills.  · Weakness.  · Nausea.  · Muscle aches.  · Loss of appetite.  How is this diagnosed?  This condition is diagnosed with a medical history and physical exam. You may also have a stool test to check for viruses or other infections.  How is this treated?  This condition typically goes away on its  own. The focus of treatment is to prevent dehydration and restore lost fluids (rehydration). This condition may be treated with:  · An oral rehydration solution (ORS) to replace important salts and minerals (electrolytes) in your body. Take this if told by your health care provider. This is a drink that is sold at pharmacies and retail stores.  · Medicines to help with your symptoms.  · Probiotic supplements to reduce symptoms of diarrhea.  · Fluids given through an IV, if dehydration is severe.  Older adults and people with other diseases or a weak immune system are at higher risk for dehydration.  Follow these instructions at home:    Eating and drinking    · Take an ORS as told by your health care provider.  · Drink clear fluids in small amounts as you are able. Clear fluids include:  ? Water.  ? Ice chips.  ? Diluted fruit juice.  ? Low-calorie sports drinks.  · Drink enough fluid to keep your urine pale yellow.  · Eat small amounts of healthy foods every 3-4 hours as you are able. This may include whole grains, fruits, vegetables, lean meats, and yogurt.  · Avoid fluids that contain a lot of sugar or caffeine, such as energy drinks, sports drinks, and soda.  · Avoid spicy or fatty foods.  · Avoid alcohol.    General instructions  · Wash your hands often, especially after having diarrhea or vomiting. If soap and water are not available, use hand .  · Make sure that all people in your household wash their hands well and often.  · Take over-the-counter and prescription medicines only as told by your health care provider.  · Rest at home while you recover.  · Watch your condition for any changes.  · Take a warm bath to relieve any burning or pain from frequent diarrhea episodes.  · Keep all follow-up visits as told by your health care provider. This is important.  Contact a health care provider if you:  · Cannot keep fluids down.  · Have symptoms that get worse.  · Have new symptoms.  · Feel light-headed  or dizzy.  · Have muscle cramps.  Get help right away if you:  · Have chest pain.  · Feel extremely weak or you faint.  · See blood in your vomit.  · Have vomit that looks like coffee grounds.  · Have bloody or black stools or stools that look like tar.  · Have a severe headache, a stiff neck, or both.  · Have a rash.  · Have severe pain, cramping, or bloating in your abdomen.  · Have trouble breathing or you are breathing very quickly.  · Have a fast heartbeat.  · Have skin that feels cold and clammy.  · Feel confused.  · Have pain when you urinate.  · Have signs of dehydration, such as:  ? Dark urine, very little urine, or no urine.  ? Cracked lips.  ? Dry mouth.  ? Sunken eyes.  ? Sleepiness.  ? Weakness.  Summary  · Viral gastroenteritis is also known as the stomach flu. It can cause sudden watery diarrhea, fever, and vomiting.  · This condition can be passed from person to person very easily (is contagious).  · Take an ORS if told by your health care provider. This is a drink that is sold at pharmacies and retail stores.  · Wash your hands often, especially after having diarrhea or vomiting. If soap and water are not available, use hand .  This information is not intended to replace advice given to you by your health care provider. Make sure you discuss any questions you have with your health care provider.  Document Revised: 06/05/2020 Document Reviewed: 10/23/2019  [employer] Patient Education © 2021 [employer]

## 2021-09-10 ENCOUNTER — PATIENT ROUNDING (BHMG ONLY) (OUTPATIENT)
Dept: FAMILY MEDICINE CLINIC | Facility: CLINIC | Age: 51
End: 2021-09-10

## 2021-09-10 LAB
ALBUMIN SERPL-MCNC: 4.4 G/DL (ref 3.5–5.2)
ALBUMIN/GLOB SERPL: 1.8 G/DL
ALP SERPL-CCNC: 135 U/L (ref 39–117)
ALT SERPL-CCNC: 33 U/L (ref 1–33)
APPEARANCE UR: ABNORMAL
AST SERPL-CCNC: 26 U/L (ref 1–32)
BACTERIA #/AREA URNS HPF: ABNORMAL /HPF
BACTERIA UR CULT: NO GROWTH
BACTERIA UR CULT: NORMAL
BASOPHILS # BLD AUTO: 0.09 10*3/MM3 (ref 0–0.2)
BASOPHILS NFR BLD AUTO: 1 % (ref 0–1.5)
BILIRUB SERPL-MCNC: 0.3 MG/DL (ref 0–1.2)
BILIRUB UR QL STRIP: NEGATIVE
BUN SERPL-MCNC: 7 MG/DL (ref 6–20)
BUN/CREAT SERPL: 10.6 (ref 7–25)
CALCIUM SERPL-MCNC: 9.7 MG/DL (ref 8.6–10.5)
CASTS URNS QL MICRO: ABNORMAL /LPF
CHLORIDE SERPL-SCNC: 102 MMOL/L (ref 98–107)
CHOLEST SERPL-MCNC: 176 MG/DL (ref 0–200)
CHOLEST/HDLC SERPL: 2.89 {RATIO}
CO2 SERPL-SCNC: 27.9 MMOL/L (ref 22–29)
COLOR UR: YELLOW
CREAT SERPL-MCNC: 0.66 MG/DL (ref 0.57–1)
CRYSTALS URNS MICRO: ABNORMAL
EOSINOPHIL # BLD AUTO: 0.08 10*3/MM3 (ref 0–0.4)
EOSINOPHIL NFR BLD AUTO: 0.9 % (ref 0.3–6.2)
EPI CELLS #/AREA URNS HPF: ABNORMAL /HPF (ref 0–10)
ERYTHROCYTE [DISTWIDTH] IN BLOOD BY AUTOMATED COUNT: 12.3 % (ref 12.3–15.4)
GLOBULIN SER CALC-MCNC: 2.4 GM/DL
GLUCOSE SERPL-MCNC: 92 MG/DL (ref 65–99)
GLUCOSE UR QL: NEGATIVE
HCT VFR BLD AUTO: 39.1 % (ref 34–46.6)
HDLC SERPL-MCNC: 61 MG/DL (ref 40–60)
HGB BLD-MCNC: 12.9 G/DL (ref 12–15.9)
HGB UR QL STRIP: NEGATIVE
IMM GRANULOCYTES # BLD AUTO: 0.29 10*3/MM3 (ref 0–0.05)
IMM GRANULOCYTES NFR BLD AUTO: 3.3 % (ref 0–0.5)
KETONES UR QL STRIP: ABNORMAL
LDLC SERPL CALC-MCNC: 100 MG/DL (ref 0–100)
LEUKOCYTE ESTERASE UR QL STRIP: ABNORMAL
LYMPHOCYTES # BLD AUTO: 2.66 10*3/MM3 (ref 0.7–3.1)
LYMPHOCYTES NFR BLD AUTO: 30.6 % (ref 19.6–45.3)
MCH RBC QN AUTO: 31.4 PG (ref 26.6–33)
MCHC RBC AUTO-ENTMCNC: 33 G/DL (ref 31.5–35.7)
MCV RBC AUTO: 95.1 FL (ref 79–97)
MICRO URNS: ABNORMAL
MONOCYTES # BLD AUTO: 0.61 10*3/MM3 (ref 0.1–0.9)
MONOCYTES NFR BLD AUTO: 7 % (ref 5–12)
NEUTROPHILS # BLD AUTO: 4.96 10*3/MM3 (ref 1.7–7)
NEUTROPHILS NFR BLD AUTO: 57.2 % (ref 42.7–76)
NITRITE UR QL STRIP: NEGATIVE
NRBC BLD AUTO-RTO: 0 /100 WBC (ref 0–0.2)
PH UR STRIP: 5.5 [PH] (ref 5–7.5)
PLATELET # BLD AUTO: 413 10*3/MM3 (ref 140–450)
POTASSIUM SERPL-SCNC: 5.1 MMOL/L (ref 3.5–5.2)
PROT SERPL-MCNC: 6.8 G/DL (ref 6–8.5)
PROT UR QL STRIP: ABNORMAL
RBC # BLD AUTO: 4.11 10*6/MM3 (ref 3.77–5.28)
RBC #/AREA URNS HPF: ABNORMAL /HPF (ref 0–2)
SODIUM SERPL-SCNC: 139 MMOL/L (ref 136–145)
SP GR UR: 1.02 (ref 1–1.03)
TRIGL SERPL-MCNC: 82 MG/DL (ref 0–150)
TSH SERPL DL<=0.005 MIU/L-ACNC: 1.24 UIU/ML (ref 0.27–4.2)
UNIDENT CRYS URNS QL MICRO: PRESENT /LPF
URINALYSIS REFLEX: ABNORMAL
UROBILINOGEN UR STRIP-MCNC: 1 MG/DL (ref 0.2–1)
VLDLC SERPL CALC-MCNC: 15 MG/DL (ref 5–40)
WBC # BLD AUTO: 8.69 10*3/MM3 (ref 3.4–10.8)
WBC #/AREA URNS HPF: ABNORMAL /HPF (ref 0–5)

## 2021-09-10 NOTE — PROGRESS NOTES
"September 10, 2021    Hello, may I speak with Cara Eisenberg?    My name is Kamille     I am  with [facility]  [facility]  [address] [place] [id number] [phone].    Before we get started may I verify your date of birth? 1970    I am calling to officially welcome you to our practice and ask about your recent visit. Is this a good time to talk? Yes    Tell me about your visit with us. What things went well?  \"Everything was perfect, he is the first doctor I've seen in years that really sat down and listened to me and answered my questions\"        We're always looking for ways to make our patients' experiences even better. Do you have recommendations on ways we may improve? No    Overall were you satisfied with your first visit to our practice? Yes       I appreciate you taking the time to speak with me today. Is there anything else I can do for you? No      Thank you, and have a great day.      "

## 2021-09-10 NOTE — PROGRESS NOTES
Please call the patient regarding her result(s).  Please let her know that her blood count is normal.  Her blood sugar and kidneys are normal.  She has 1 mildly elevated liver enzyme.  Her cholesterol and thyroid are normal.

## 2021-09-13 LAB
BACTERIA SPEC CULT: NORMAL
BACTERIA SPEC CULT: NORMAL
C DIFF TOX A+B STL QL IA: NEGATIVE
CAMPYLOBACTER STL CULT: NORMAL
E COLI SXT STL QL IA: NEGATIVE
G LAMBLIA AG STL QL IA: NEGATIVE
O+P SPEC MICRO: NORMAL
O+P STL CONC: NORMAL
SALM + SHIG STL CULT: NORMAL

## 2021-09-13 NOTE — PROGRESS NOTES
Please call and let the patient know her stool cultures were all negative.  Please ask her if her diarrhea has resolved or nearly resolved.  If her diarrhea and other symptoms have not resolved or are not getting much better, please return to the office within the next 1 to 2 days for further evaluation and management.

## 2021-09-15 ENCOUNTER — OFFICE VISIT (OUTPATIENT)
Dept: FAMILY MEDICINE CLINIC | Facility: CLINIC | Age: 51
End: 2021-09-15

## 2021-09-15 VITALS
OXYGEN SATURATION: 99 % | BODY MASS INDEX: 26.67 KG/M2 | DIASTOLIC BLOOD PRESSURE: 68 MMHG | TEMPERATURE: 96.9 F | HEART RATE: 63 BPM | HEIGHT: 68 IN | WEIGHT: 176 LBS | SYSTOLIC BLOOD PRESSURE: 102 MMHG

## 2021-09-15 DIAGNOSIS — A08.4 VIRAL GASTROENTERITIS: Primary | ICD-10-CM

## 2021-09-15 PROCEDURE — 99212 OFFICE O/P EST SF 10 MIN: CPT | Performed by: FAMILY MEDICINE

## 2021-09-15 NOTE — PATIENT INSTRUCTIONS
Viral Gastroenteritis, Adult    Viral gastroenteritis is also known as the stomach flu. This condition may affect your stomach, small intestine, and large intestine. It can cause sudden watery diarrhea, fever, and vomiting. This condition is caused by many different viruses. These viruses can be passed from person to person very easily (are contagious).  Diarrhea and vomiting can make you feel weak and cause you to become dehydrated. You may not be able to keep fluids down. Dehydration can make you tired and thirsty, cause you to have a dry mouth, and decrease how often you urinate. It is important to replace the fluids that you lose from diarrhea and vomiting.  What are the causes?  Gastroenteritis is caused by many viruses, including rotavirus and norovirus. Norovirus is the most common cause in adults. You can get sick after being exposed to the viruses from other people. You can also get sick by:  · Eating food, drinking water, or touching a surface contaminated with one of these viruses.  · Sharing utensils or other personal items with an infected person.  What increases the risk?  You are more likely to develop this condition if you:  · Have a weak body defense system (immune system).  · Live with one or more children who are younger than 2 years old.  · Live in a nursing home.  · Travel on cruise ships.  What are the signs or symptoms?  Symptoms of this condition start suddenly 1-3 days after exposure to a virus. Symptoms may last for a few days or for as long as a week. Common symptoms include watery diarrhea and vomiting. Other symptoms include:  · Fever.  · Headache.  · Fatigue.  · Pain in the abdomen.  · Chills.  · Weakness.  · Nausea.  · Muscle aches.  · Loss of appetite.  How is this diagnosed?  This condition is diagnosed with a medical history and physical exam. You may also have a stool test to check for viruses or other infections.  How is this treated?  This condition typically goes away on its  own. The focus of treatment is to prevent dehydration and restore lost fluids (rehydration). This condition may be treated with:  · An oral rehydration solution (ORS) to replace important salts and minerals (electrolytes) in your body. Take this if told by your health care provider. This is a drink that is sold at pharmacies and retail stores.  · Medicines to help with your symptoms.  · Probiotic supplements to reduce symptoms of diarrhea.  · Fluids given through an IV, if dehydration is severe.  Older adults and people with other diseases or a weak immune system are at higher risk for dehydration.  Follow these instructions at home:    Eating and drinking    · Take an ORS as told by your health care provider.  · Drink clear fluids in small amounts as you are able. Clear fluids include:  ? Water.  ? Ice chips.  ? Diluted fruit juice.  ? Low-calorie sports drinks.  · Drink enough fluid to keep your urine pale yellow.  · Eat small amounts of healthy foods every 3-4 hours as you are able. This may include whole grains, fruits, vegetables, lean meats, and yogurt.  · Avoid fluids that contain a lot of sugar or caffeine, such as energy drinks, sports drinks, and soda.  · Avoid spicy or fatty foods.  · Avoid alcohol.    General instructions  · Wash your hands often, especially after having diarrhea or vomiting. If soap and water are not available, use hand .  · Make sure that all people in your household wash their hands well and often.  · Take over-the-counter and prescription medicines only as told by your health care provider.  · Rest at home while you recover.  · Watch your condition for any changes.  · Take a warm bath to relieve any burning or pain from frequent diarrhea episodes.  · Keep all follow-up visits as told by your health care provider. This is important.  Contact a health care provider if you:  · Cannot keep fluids down.  · Have symptoms that get worse.  · Have new symptoms.  · Feel light-headed  or dizzy.  · Have muscle cramps.  Get help right away if you:  · Have chest pain.  · Feel extremely weak or you faint.  · See blood in your vomit.  · Have vomit that looks like coffee grounds.  · Have bloody or black stools or stools that look like tar.  · Have a severe headache, a stiff neck, or both.  · Have a rash.  · Have severe pain, cramping, or bloating in your abdomen.  · Have trouble breathing or you are breathing very quickly.  · Have a fast heartbeat.  · Have skin that feels cold and clammy.  · Feel confused.  · Have pain when you urinate.  · Have signs of dehydration, such as:  ? Dark urine, very little urine, or no urine.  ? Cracked lips.  ? Dry mouth.  ? Sunken eyes.  ? Sleepiness.  ? Weakness.  Summary  · Viral gastroenteritis is also known as the stomach flu. It can cause sudden watery diarrhea, fever, and vomiting.  · This condition can be passed from person to person very easily (is contagious).  · Take an ORS if told by your health care provider. This is a drink that is sold at pharmacies and retail stores.  · Wash your hands often, especially after having diarrhea or vomiting. If soap and water are not available, use hand .  This information is not intended to replace advice given to you by your health care provider. Make sure you discuss any questions you have with your health care provider.  Document Revised: 06/05/2020 Document Reviewed: 10/23/2019  [employer] Patient Education © 2021 [employer]

## 2021-09-15 NOTE — PROGRESS NOTES
Chief Complaint  F/U on RUQ Abdominal Pain, F/U on Diarrhea, and F/U on CT/and Stool Study Results    Subjective          Cara Eisenberg presents to [facility] for     History of Present Illness       Pt denies wanting to get a FLU vaccine @ todays visit.    Pt states she has seen improvement in her symptoms.     --MKB,RMA    Patient states she is feeling much better and all of her symptoms are resolved.  No nausea, vomiting, or diarrhea.  No fever or chills.  She states she feels like she is back to normal.      Health Maintenance Due   Topic Date Due   • ANNUAL PHYSICAL  Never done   • COVID-19 Vaccine (1) Never done   • TDAP/TD VACCINES (1 - Tdap) Never done   • HEPATITIS C SCREENING  Never done   • MAMMOGRAM  04/11/2018   • ZOSTER VACCINE (1 of 2) Never done   • PAP SMEAR  05/16/2021        reports that she quit smoking about 9 years ago. She has a 7.50 pack-year smoking history. She has never used smokeless tobacco. She reports current alcohol use. She reports that she does not use drugs.    PHQ-9 Depression Screening  Little interest or pleasure in doing things?     Feeling down, depressed, or hopeless?     Trouble falling or staying asleep, or sleeping too much?     Feeling tired or having little energy?     Poor appetite or overeating?     Feeling bad about yourself - or that you are a failure or have let yourself or your family down?     Trouble concentrating on things, such as reading the newspaper or watching television?     Moving or speaking so slowly that other people could have noticed? Or the opposite - being so fidgety or restless that you have been moving around a lot more than usual?     Thoughts that you would be better off dead, or of hurting yourself in some way?     PHQ-9 Total Score     If you checked off any problems, how difficult have these problems made it for you to do your work, take care of things at home, or get along with other people?       Lab Results  "  Component Value Date    WBC 8.69 09/08/2021    HGB 12.9 09/08/2021    HCT 39.1 09/08/2021    MCV 95.1 09/08/2021     09/08/2021     Lab Results   Component Value Date    GLUCOSE 113 (H) 12/29/2020    BUN 7 09/08/2021    CREATININE 0.66 09/08/2021    EGFRIFNONA 94 09/08/2021    EGFRIFAFRI 114 09/08/2021    BCR 10.6 09/08/2021    K 5.1 09/08/2021    CO2 27.9 09/08/2021    CALCIUM 9.7 09/08/2021    PROTENTOTREF 6.8 09/08/2021    ALBUMIN 4.40 09/08/2021    LABIL2 1.8 09/08/2021    AST 26 09/08/2021    ALT 33 09/08/2021     Lab Results   Component Value Date    TSH 1.240 09/08/2021     No results found for: HGBA1C  Brief Urine Lab Results  (Last result in the past 365 days)      Color   Clarity   Blood   Leuk Est   Nitrite   Protein   CREAT   Urine HCG        09/08/21 1000 Yellow Turbid Negative Trace Negative Trace               BP Readings from Last 12 Encounters:   09/15/21 102/68   09/08/21 100/64   09/02/21 111/73   08/03/21 133/84   01/11/21 110/72   12/29/20 110/61   12/28/20 118/75   12/22/20 118/69   05/17/18 118/70   05/16/18 110/74   08/18/17 118/67   08/15/17 105/63       Wt Readings from Last 12 Encounters:   09/15/21 79.8 kg (176 lb)   09/08/21 79.2 kg (174 lb 9.6 oz)   09/02/21 81.6 kg (180 lb)   08/03/21 81.6 kg (180 lb)   01/11/21 77.1 kg (170 lb)   12/29/20 77.1 kg (170 lb)   12/28/20 77.1 kg (170 lb)   12/22/20 81.6 kg (180 lb)   05/17/18 79.8 kg (176 lb)   05/16/18 79.8 kg (176 lb)   08/18/17 79.4 kg (175 lb 0.7 oz)   08/15/17 79.4 kg (175 lb)       Objective   Vital Signs:   /68 (BP Location: Left arm, Patient Position: Sitting, Cuff Size: Large Adult)   Pulse 63   Temp 96.9 °F (36.1 °C) (Temporal)   Ht 172.7 cm (68\")   Wt 79.8 kg (176 lb)   SpO2 99%   BMI 26.76 kg/m²     Physical Exam  Vitals and nursing note reviewed.   Constitutional:       General: She is not in acute distress.     Appearance: Normal appearance. She is well-developed. She is not diaphoretic.   HENT:      " Head: Normocephalic and atraumatic.      Right Ear: External ear normal.      Left Ear: External ear normal.      Nose: Nose normal.      Mouth/Throat:      Pharynx: No oropharyngeal exudate.   Eyes:      General: Lids are normal. No scleral icterus.        Right eye: No discharge.         Left eye: No discharge.   Neck:      Thyroid: No thyromegaly.      Trachea: Trachea normal. No tracheal deviation.   Cardiovascular:      Rate and Rhythm: Normal rate and regular rhythm.      Heart sounds: Normal heart sounds. No murmur heard.   No friction rub. No gallop.    Pulmonary:      Effort: Pulmonary effort is normal. No tachypnea, bradypnea or respiratory distress.      Breath sounds: Normal breath sounds. No stridor. No decreased breath sounds, wheezing or rales.   Chest:      Chest wall: No tenderness.   Abdominal:      General: Abdomen is flat. Bowel sounds are normal. There is no distension.      Palpations: Abdomen is soft. There is no mass.      Tenderness: There is no abdominal tenderness. There is no left CVA tenderness, guarding or rebound.      Hernia: No hernia is present.   Musculoskeletal:      Cervical back: Full passive range of motion without pain, normal range of motion and neck supple. No edema.   Lymphadenopathy:      Head:      Right side of head: No submental, submandibular, tonsillar, preauricular, posterior auricular or occipital adenopathy.      Left side of head: No submental, submandibular, tonsillar, preauricular, posterior auricular or occipital adenopathy.      Cervical: No cervical adenopathy.      Right cervical: No superficial, deep or posterior cervical adenopathy.     Left cervical: No superficial, deep or posterior cervical adenopathy.   Skin:     General: Skin is warm and dry.      Capillary Refill: Capillary refill takes less than 2 seconds.      Coloration: Skin is not pale.      Findings: No erythema or rash.      Nails: There is no clubbing.   Neurological:      Mental Status: She  is alert and oriented to person, place, and time. She is not disoriented.      Deep Tendon Reflexes: Reflexes are normal and symmetric.   Psychiatric:         Behavior: Behavior normal.        Result Review :                 Assessment and Plan    Diagnoses and all orders for this visit:    1. Viral gastroenteritis (Primary)  Comments:  Resolved         Follow Up   Return in about 4 weeks (around 10/13/2021) for Annual physical.  Patient was given instructions and counseling regarding her condition or for health maintenance advice. Please see specific information pulled into the AVS if appropriate.

## 2021-10-17 ENCOUNTER — HOSPITAL ENCOUNTER (EMERGENCY)
Facility: HOSPITAL | Age: 51
Discharge: HOME | End: 2021-10-17
Attending: EMERGENCY MEDICINE | Admitting: EMERGENCY MEDICINE

## 2021-10-17 ENCOUNTER — APPOINTMENT (OUTPATIENT)
Dept: GENERAL RADIOLOGY | Facility: HOSPITAL | Age: 51
End: 2021-10-17

## 2021-10-17 VITALS
HEIGHT: 67 IN | HEART RATE: 53 BPM | TEMPERATURE: 99.1 F | SYSTOLIC BLOOD PRESSURE: 126 MMHG | BODY MASS INDEX: 27.31 KG/M2 | WEIGHT: 174 LBS | OXYGEN SATURATION: 100 % | RESPIRATION RATE: 18 BRPM | DIASTOLIC BLOOD PRESSURE: 61 MMHG

## 2021-10-17 DIAGNOSIS — S20.212A CONTUSION OF RIB ON LEFT SIDE, INITIAL ENCOUNTER: Primary | ICD-10-CM

## 2021-10-17 PROCEDURE — 71101 X-RAY EXAM UNILAT RIBS/CHEST: CPT

## 2021-10-17 PROCEDURE — 99282 EMERGENCY DEPT VISIT SF MDM: CPT

## 2021-10-17 PROCEDURE — 99282 EMERGENCY DEPT VISIT SF MDM: CPT | Performed by: EMERGENCY MEDICINE

## 2021-10-17 RX ORDER — HYDROCODONE BITARTRATE AND ACETAMINOPHEN 5; 325 MG/1; MG/1
1 TABLET ORAL EVERY 6 HOURS PRN
Qty: 30 TABLET | Refills: 0 | Status: SHIPPED | OUTPATIENT
Start: 2021-10-17

## 2021-10-17 NOTE — DISCHARGE INSTRUCTIONS
Cough and deep breathe 2-3 times an hour while you are awake for 2 to 3 weeks follow-up with  in 1 week.  Return to the emergency department if worsening pain, shortness of breath, fever, chills, worse in any way at all.

## 2021-10-17 NOTE — ED PROVIDER NOTES
Subjective   History of Present Illness  History of Present Illness    Chief complaint: Chest pain    Location: Left-sided    Quality/Severity: Moderate, sharp    Timing/Onset/Duration: Began Friday    Modifying Factors: Hurts to move and take a deep breath, feels better to remain still    Associated Symptoms: Minimal shortness of breath.  No fever chills or cough.  No nausea or vomiting.  No abdominal pain.  No numbness, tingling, or weakness.    Narrative: This 51-year-old white female presents with left-sided rib pain after a friend picked her up and swung around giving her a hug on Friday.  She states that she has a crepitus type sound in her chest when she moves.  She has not taken anything for pain.  She is driving.  She does not want anything for pain    PCP: Surendra Schaefer Jr., DO      Review of Systems   Constitutional: Negative for chills and fever.   Respiratory: Positive for shortness of breath.    Cardiovascular: Positive for chest pain.   Gastrointestinal: Negative for abdominal pain, nausea and vomiting.   Neurological: Negative for weakness and numbness.        Medication List      ASK your doctor about these medications    multivitamin with minerals tablet tablet            Past Medical History:   Diagnosis Date   • Abnormal Pap smear of cervix    • Abnormal uterine bleeding    • Back pain    • COVID-19    • Premature ovarian failure 5/20/2018   • Uterine fibroid     uterine and ovarian nodules       Allergies   Allergen Reactions   • Penicillins Anaphylaxis       Past Surgical History:   Procedure Laterality Date   • D & C HYSTEROSCOPY N/A 5/17/2016    Procedure: DILATATION AND CURETTAGE HYSTEROSCOPY;  Surgeon: Suzy Chino MD;  Location: [facility];  Service:    • DILATATION AND CURETTAGE     • PELVIC LAPAROSCOPY     • TONSILLECTOMY     • TUBAL ABDOMINAL LIGATION         Family History   Problem Relation Age of Onset   • Breast cancer Maternal Grandmother    • Cervical cancer  Maternal Grandmother    • Breast cancer Maternal Aunt 40   • Colon cancer Father    • Testicular cancer Father    • Leukemia Father    • Hypertension Father    • Stroke Father    • Cervical cancer Mother    • Uterine cancer Mother    • Hypertension Mother    • Brain cancer Sister    • Melanoma Maternal Grandfather        Social History     Socioeconomic History   • Marital status:    Tobacco Use   • Smoking status: Former Smoker     Packs/day: 0.25     Years: 30.00     Pack years: 7.50     Quit date:      Years since quittin.8   • Smokeless tobacco: Never Used   • Tobacco comment: Less than a pack a week   Vaping Use   • Vaping Use: Never used   Substance and Sexual Activity   • Alcohol use: Yes     Comment: occasionally   • Drug use: No   • Sexual activity: Yes     Partners: Male     Birth control/protection: Post-menopausal, Surgical     Comment: BTL           Objective   Physical Exam  Vitals (The temperature is 99.1 °F, pulse 53, respirations 18, blood pressure 126/61, room air pulse ox 100%.) and nursing note reviewed.   Constitutional:       Appearance: Normal appearance.      Comments: The patient is grasping under her left breast on her chest wall.   Cardiovascular:      Rate and Rhythm: Normal rate and regular rhythm.      Heart sounds: No murmur heard.  No friction rub. No gallop.    Pulmonary:      Effort: Pulmonary effort is normal.      Breath sounds: Normal breath sounds.   Abdominal:      General: Abdomen is flat. Bowel sounds are normal.      Palpations: There is no mass.      Tenderness: There is no abdominal tenderness. There is no guarding or rebound.   Skin:     General: Skin is warm and dry.   Neurological:      General: No focal deficit present.      Mental Status: She is alert and oriented to person, place, and time.         Procedures           ED Course      10:59 EDT, 10/17/21:  The patient was reassessed.  She has no new complaints.  Her vital signs reviewed and are  stable.    10:59 EDT, 10/17/21:  The patient's diagnosis of rib contusion was discussed with her.  It was discussed with her that she may indeed have cracked ribs that are not showing up on the plain rib films.  The patient should cough and deep breathe 2-3 times an hour while she is awake for 2 weeks.  The patient should take Colace as needed as directed for constipation if she is taking the pain medication.  The patient should follow-up with Dr. Schaefer in 1 week.  She should return the emergency department there is worsening pain, shortness of breath, fever, chills, worse in any way at all all the patient's questions were answered she will be discharged in good condition.                                     MDM    Final diagnoses:   Contusion of rib on left side, initial encounter       ED Disposition  ED Disposition     None          No follow-up provider specified.       Medication List      No changes were made to your prescriptions during this visit.          Johnny Ceballos MD  10/17/21 [id number]

## 2021-11-01 ENCOUNTER — HOSPITAL ENCOUNTER (OUTPATIENT)
Dept: MAMMOGRAPHY | Facility: HOSPITAL | Age: 51
Discharge: HOME | End: 2021-11-01
Admitting: FAMILY MEDICINE

## 2021-11-01 DIAGNOSIS — Z12.31 SCREENING MAMMOGRAM, ENCOUNTER FOR: ICD-10-CM

## 2021-11-01 PROCEDURE — 77067 SCR MAMMO BI INCL CAD: CPT

## 2021-11-01 PROCEDURE — 77063 BREAST TOMOSYNTHESIS BI: CPT

## 2022-02-25 NOTE — DISCHARGE SUMMARY
Patient feels remarkably better today has been afebrile taking by mouth well white count normalizing.  Physical exam shows dramatic improvement and oropharyngeal edema and erythema neck exam is also markedly better with less tender adenopathy.  Remainder of the physical exam is within normal limits think at this point we can discontinue her IV antibiotics for switch her over to by mouth clindamycin 300 mg 4 times a day push fluids and rest and I will follow her up in the office on Wednesday we briefly discussed potential for surgical intervention and/or tonsillectomy but would like to delay this is long as possible  
no